# Patient Record
Sex: MALE | Race: WHITE | NOT HISPANIC OR LATINO
[De-identification: names, ages, dates, MRNs, and addresses within clinical notes are randomized per-mention and may not be internally consistent; named-entity substitution may affect disease eponyms.]

---

## 2023-05-24 PROBLEM — Z00.00 ENCOUNTER FOR PREVENTIVE HEALTH EXAMINATION: Status: ACTIVE | Noted: 2023-05-24

## 2023-06-22 ENCOUNTER — APPOINTMENT (OUTPATIENT)
Dept: ORTHOPEDIC SURGERY | Facility: CLINIC | Age: 68
End: 2023-06-22
Payer: COMMERCIAL

## 2023-06-22 VITALS — WEIGHT: 192 LBS | BODY MASS INDEX: 31.99 KG/M2 | HEIGHT: 65 IN

## 2023-06-22 DIAGNOSIS — Z78.9 OTHER SPECIFIED HEALTH STATUS: ICD-10-CM

## 2023-06-22 DIAGNOSIS — M54.50 LOW BACK PAIN, UNSPECIFIED: ICD-10-CM

## 2023-06-22 PROCEDURE — 72170 X-RAY EXAM OF PELVIS: CPT

## 2023-06-22 PROCEDURE — 99214 OFFICE O/P EST MOD 30 MIN: CPT

## 2023-06-22 PROCEDURE — 72110 X-RAY EXAM L-2 SPINE 4/>VWS: CPT
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
## 2023-06-22 NOTE — PHYSICAL EXAM
[Straightening consistent with spasm] : Straightening consistent with spasm [Disc space narrowing] : Disc space narrowing [] : no lumbar paraspinal tenderness [FreeTextEntry1] : multilevel spondylosis [FreeTextEntry3] : L quad atrophy

## 2023-06-22 NOTE — ASSESSMENT
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

## 2023-07-11 ENCOUNTER — APPOINTMENT (OUTPATIENT)
Dept: ORTHOPEDIC SURGERY | Facility: CLINIC | Age: 68
End: 2023-07-11
Payer: COMMERCIAL

## 2023-07-11 VITALS — WEIGHT: 192 LBS | BODY MASS INDEX: 31.99 KG/M2 | HEIGHT: 65 IN

## 2023-07-11 PROCEDURE — 99215 OFFICE O/P EST HI 40 MIN: CPT

## 2023-07-11 NOTE — ASSESSMENT
[FreeTextEntry1] : 68 year M with left hip OA, tonnis grade 3\par \par I,Lazarus Chaney M.D. discussed the patient’s diagnosis and treatment options, non-surgical and surgical, with the patient and/or legal guardian including the potential benefits and complications of each. Potential complications of non-surgical treatments discussed include residual pain and/or disability, non-healing, progression of symptoms and/or disease. Potential complications of surgery discussed include infection, nerve injury, vascular injury, cartilage injury, ligament injury, tendon injury, muscle injury, skin injury, stiffness, instability, weakness, persistent pain, technical or hardware failure, need for additional surgery, re-injury, medical complication, anesthetic related complication, and/or death. All questions were answered. The patient and/or legal guardian has elected to proceed with surgery. Informed consent obtained for Left JOHN GINNA posterior - Dual mobility\par \par                   \par Preoperative evaluation and testing as needed is advised within 30 days prior to the procedure.\par
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
## 2023-07-11 NOTE — IMAGING
[de-identified] : Constitutional: well developed and well nourished, able to communicate\par Cardiovascular: Peripheral vascular exam is grossly normal\par Neurologic: Alert and oriented, no acute distress.\par Skin: normal skin with no ulcers, rashes, or lesions\par Pulmonary: No respiratory distress, breathing comfortably on room air\par Lymphatics: No obvious lymphadenopathy or lymphedema in areas examined\par \par LOWER EXTREMITY/LEFT HIP EXAM\par Standing pelvic alignment: Symmetric with no Trendelenburg\par Atrophy: none\par Ecchymosis/swelling: none\par \par Range of Motion\par Hip: Flexion/extension/ER/IR     / EX 20/ ER 45/ IR 0/ AB 60 /AD 20\par Impingement with flexion adduction and internal rotation: POS\par Contracture: none\par Snapping hip: negative\par Greater trochanter: no tenderness\par \par Neurovascular\par Distal extremities: warm to touch\par Sensation to light touch: intact\par Muscle strength: 5/5\par \par IMAGING:\par 07/11/2023 Xrays of the Left hip 3v were taken demonstrating tonnis grade 3 Osteoarthritis with joint space collapse, sclerosis, osteophytes, and subchondral cysts

## 2023-08-07 ENCOUNTER — APPOINTMENT (OUTPATIENT)
Dept: CT IMAGING | Facility: CLINIC | Age: 68
End: 2023-08-07
Payer: COMMERCIAL

## 2023-08-07 PROCEDURE — 76376 3D RENDER W/INTRP POSTPROCES: CPT | Mod: LT

## 2023-08-07 PROCEDURE — 73700 CT LOWER EXTREMITY W/O DYE: CPT | Mod: LT

## 2023-08-22 ENCOUNTER — NON-APPOINTMENT (OUTPATIENT)
Age: 68
End: 2023-08-22

## 2023-08-22 ENCOUNTER — APPOINTMENT (OUTPATIENT)
Dept: ORTHOPEDIC SURGERY | Facility: CLINIC | Age: 68
End: 2023-08-22
Payer: COMMERCIAL

## 2023-08-22 VITALS — BODY MASS INDEX: 31.99 KG/M2 | HEIGHT: 65 IN | WEIGHT: 192 LBS

## 2023-08-22 PROCEDURE — 99215 OFFICE O/P EST HI 40 MIN: CPT

## 2023-08-22 NOTE — IMAGING
[de-identified] : Constitutional: well developed and well nourished, able to communicate Cardiovascular: Peripheral vascular exam is grossly normal Neurologic: Alert and oriented, no acute distress. Skin: normal skin with no ulcers, rashes, or lesions Pulmonary: No respiratory distress, breathing comfortably on room air Lymphatics: No obvious lymphadenopathy or lymphedema in areas examined  LOWER EXTREMITY/LEFT HIP EXAM Standing pelvic alignment: Symmetric with no Trendelenburg Atrophy: none Ecchymosis/swelling: none  Range of Motion Hip: Flexion/extension/ER/IR     / EX 20/ ER 45/ IR 0/ AB 60 /AD 20 Impingement with flexion adduction and internal rotation: POS Contracture: none Snapping hip: negative Greater trochanter: no tenderness  Neurovascular Distal extremities: warm to touch Sensation to light touch: intact Muscle strength: 5/5  [id number] Xrays of the Left hip 3v were taken demonstrating tonnis grade 3 Osteoarthritis with joint space collapse, sclerosis, osteophytes, and subchondral cysts

## 2023-08-22 NOTE — ASSESSMENT
[FreeTextEntry1] : 68 year M with left hip OA, tonnis grade 3  I,Lazarus Chaney M.D. discussed the patient's diagnosis and treatment options, non-surgical and surgical, with the patient and/or legal guardian including the potential benefits and complications of each. Potential complications of non-surgical treatments discussed include residual pain and/or disability, non-healing, progression of symptoms and/or disease. Potential complications of surgery discussed include infection, nerve injury, vascular injury, cartilage injury, ligament injury, tendon injury, muscle injury, skin injury, stiffness, instability, weakness, persistent pain, technical or hardware failure, need for additional surgery, re-injury, medical complication, anesthetic related complication, and/or death. All questions were answered. The patient and/or legal guardian has elected to proceed with surgery. Informed consent obtained for Left JOHN GINNA posterior - Dual mobility                     Preoperative evaluation and testing as needed is advised within 30 days prior to the procedure.  8/22/23: All preop questions and concerns addressed has lymphedema in the BLE. rec fu with vascular surgery for lymph edema evaluation will need bone grafting for cysts in acetabulum on chronic oxycodone
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
## 2023-09-01 ENCOUNTER — OUTPATIENT (OUTPATIENT)
Dept: OUTPATIENT SERVICES | Facility: HOSPITAL | Age: 68
LOS: 1 days | Discharge: HOME | End: 2023-09-01

## 2023-09-01 VITALS
DIASTOLIC BLOOD PRESSURE: 61 MMHG | RESPIRATION RATE: 17 BRPM | HEIGHT: 67 IN | OXYGEN SATURATION: 100 % | HEART RATE: 59 BPM | WEIGHT: 209 LBS | SYSTOLIC BLOOD PRESSURE: 159 MMHG | TEMPERATURE: 97 F

## 2023-09-01 DIAGNOSIS — Z98.890 OTHER SPECIFIED POSTPROCEDURAL STATES: Chronic | ICD-10-CM

## 2023-09-01 DIAGNOSIS — L97.909 NON-PRESSURE CHRONIC ULCER OF UNSPECIFIED PART OF UNSPECIFIED LOWER LEG WITH UNSPECIFIED SEVERITY: ICD-10-CM

## 2023-09-01 DIAGNOSIS — M16.12 UNILATERAL PRIMARY OSTEOARTHRITIS, LEFT HIP: ICD-10-CM

## 2023-09-01 DIAGNOSIS — Z01.818 ENCOUNTER FOR OTHER PREPROCEDURAL EXAMINATION: ICD-10-CM

## 2023-09-01 DIAGNOSIS — I48.92 UNSPECIFIED ATRIAL FLUTTER: ICD-10-CM

## 2023-09-01 DIAGNOSIS — R60.0 LOCALIZED EDEMA: ICD-10-CM

## 2023-09-01 DIAGNOSIS — I10 ESSENTIAL (PRIMARY) HYPERTENSION: ICD-10-CM

## 2023-09-01 LAB
A1C WITH ESTIMATED AVERAGE GLUCOSE RESULT: 5.5 % — SIGNIFICANT CHANGE UP (ref 4–5.6)
ALBUMIN SERPL ELPH-MCNC: 3.9 G/DL — SIGNIFICANT CHANGE UP (ref 3.3–5)
ALP SERPL-CCNC: 105 U/L — SIGNIFICANT CHANGE UP (ref 40–120)
ALT FLD-CCNC: 30 U/L — SIGNIFICANT CHANGE UP (ref 12–78)
ANION GAP SERPL CALC-SCNC: 8 MMOL/L — SIGNIFICANT CHANGE UP (ref 5–17)
APTT BLD: 39.2 SEC — HIGH (ref 24.5–35.6)
AST SERPL-CCNC: 28 U/L — SIGNIFICANT CHANGE UP (ref 15–37)
BASOPHILS # BLD AUTO: 0.03 K/UL — SIGNIFICANT CHANGE UP (ref 0–0.2)
BASOPHILS NFR BLD AUTO: 0.6 % — SIGNIFICANT CHANGE UP (ref 0–2)
BILIRUB SERPL-MCNC: 0.7 MG/DL — SIGNIFICANT CHANGE UP (ref 0.2–1.2)
BLD GP AB SCN SERPL QL: SIGNIFICANT CHANGE UP
BUN SERPL-MCNC: 7 MG/DL — SIGNIFICANT CHANGE UP (ref 7–23)
CALCIUM SERPL-MCNC: 9.5 MG/DL — SIGNIFICANT CHANGE UP (ref 8.5–10.1)
CHLORIDE SERPL-SCNC: 97 MMOL/L — SIGNIFICANT CHANGE UP (ref 96–108)
CO2 SERPL-SCNC: 26 MMOL/L — SIGNIFICANT CHANGE UP (ref 22–31)
CREAT SERPL-MCNC: 0.72 MG/DL — SIGNIFICANT CHANGE UP (ref 0.5–1.3)
EGFR: 100 ML/MIN/1.73M2 — SIGNIFICANT CHANGE UP
EOSINOPHIL # BLD AUTO: 0.15 K/UL — SIGNIFICANT CHANGE UP (ref 0–0.5)
EOSINOPHIL NFR BLD AUTO: 2.9 % — SIGNIFICANT CHANGE UP (ref 0–6)
ESTIMATED AVERAGE GLUCOSE: 111 MG/DL — SIGNIFICANT CHANGE UP (ref 68–114)
GLUCOSE SERPL-MCNC: 96 MG/DL — SIGNIFICANT CHANGE UP (ref 70–99)
HCT VFR BLD CALC: 39.4 % — SIGNIFICANT CHANGE UP (ref 39–50)
HGB BLD-MCNC: 13.8 G/DL — SIGNIFICANT CHANGE UP (ref 13–17)
IMM GRANULOCYTES NFR BLD AUTO: 0.4 % — SIGNIFICANT CHANGE UP (ref 0–0.9)
INR BLD: 1.46 RATIO — HIGH (ref 0.85–1.18)
LYMPHOCYTES # BLD AUTO: 1.77 K/UL — SIGNIFICANT CHANGE UP (ref 1–3.3)
LYMPHOCYTES # BLD AUTO: 34.8 % — SIGNIFICANT CHANGE UP (ref 13–44)
MCHC RBC-ENTMCNC: 31.2 PG — SIGNIFICANT CHANGE UP (ref 27–34)
MCHC RBC-ENTMCNC: 35 G/DL — SIGNIFICANT CHANGE UP (ref 32–36)
MCV RBC AUTO: 88.9 FL — SIGNIFICANT CHANGE UP (ref 80–100)
MONOCYTES # BLD AUTO: 0.62 K/UL — SIGNIFICANT CHANGE UP (ref 0–0.9)
MONOCYTES NFR BLD AUTO: 12.2 % — SIGNIFICANT CHANGE UP (ref 2–14)
NEUTROPHILS # BLD AUTO: 2.5 K/UL — SIGNIFICANT CHANGE UP (ref 1.8–7.4)
NEUTROPHILS NFR BLD AUTO: 49.1 % — SIGNIFICANT CHANGE UP (ref 43–77)
NRBC # BLD: 0 /100 WBCS — SIGNIFICANT CHANGE UP (ref 0–0)
PLATELET # BLD AUTO: 212 K/UL — SIGNIFICANT CHANGE UP (ref 150–400)
POTASSIUM SERPL-MCNC: 5 MMOL/L — SIGNIFICANT CHANGE UP (ref 3.5–5.3)
POTASSIUM SERPL-SCNC: 5 MMOL/L — SIGNIFICANT CHANGE UP (ref 3.5–5.3)
PROT SERPL-MCNC: 8.6 GM/DL — HIGH (ref 6–8.3)
PROTHROM AB SERPL-ACNC: 17.3 SEC — HIGH (ref 9.5–13)
RBC # BLD: 4.43 M/UL — SIGNIFICANT CHANGE UP (ref 4.2–5.8)
RBC # FLD: 12.1 % — SIGNIFICANT CHANGE UP (ref 10.3–14.5)
SODIUM SERPL-SCNC: 131 MMOL/L — LOW (ref 135–145)
VIT D25+D1,25 OH+D1,25 PNL SERPL-MCNC: 43.3 PG/ML — SIGNIFICANT CHANGE UP (ref 19.9–79.3)
WBC # BLD: 5.09 K/UL — SIGNIFICANT CHANGE UP (ref 3.8–10.5)
WBC # FLD AUTO: 5.09 K/UL — SIGNIFICANT CHANGE UP (ref 3.8–10.5)

## 2023-09-01 NOTE — PHYSICAL THERAPY INITIAL EVALUATION ADULT - GAIT DEVIATIONS NOTED, PT EVAL
decreased step length/decreased weight-shifting ability decreased jay/increased time in double stance/decreased step length/decreased stride length/increased stride width/decreased weight-shifting ability

## 2023-09-01 NOTE — PHYSICAL THERAPY INITIAL EVALUATION ADULT - NSPTDMEREC_GEN_A_CORE
Pt owns straight cane, quad cane, standard walker, and wheelchair/rolling walker/toileting Pt owns straight cane, quad cane, standard walker, and rollator/rolling walker/toileting

## 2023-09-01 NOTE — PHYSICAL THERAPY INITIAL EVALUATION ADULT - LEVEL OF INDEPENDENCE: STAND/SIT, REHAB EVAL
contact guard/minimum assist (75% patients effort) due to pain/contact guard/minimum assist (75% patients effort)

## 2023-09-01 NOTE — H&P PST ADULT - MOUTH
mallampati 1, denies loose teeth/normal mouth and gums/moist mallampati 1, denies dentures, has all missing teeth/normal mouth and gums/moist

## 2023-09-01 NOTE — H&P PST ADULT - PROBLEM SELECTOR PLAN 3
NOTED: left lower leg swelling 3+ pitting w/ lateral LLE ulcer - pt states he banged leg x 2 weeks ago on a furniture, leg weeping with slight redness to site - will need M/C to comment

## 2023-09-01 NOTE — H&P PST ADULT - NSICDXPASTMEDICALHX_GEN_ALL_CORE_FT
PAST MEDICAL HISTORY:  Osteoarthritis of left hip      PAST MEDICAL HISTORY:  Atrial flutter     Hypertension     Leg ulcer     Osteoarthritis of left hip     Pedal edema

## 2023-09-01 NOTE — PHYSICAL THERAPY INITIAL EVALUATION ADULT - IMPAIRMENTS CONTRIBUTING TO GAIT DEVIATIONS, PT EVAL
impaired balance/pain/decreased ROM/decreased strength impaired balance/decreased flexibility/pain/decreased ROM/decreased strength

## 2023-09-01 NOTE — PHYSICAL THERAPY INITIAL EVALUATION ADULT - PERTINENT HX OF CURRENT PROBLEM, REHAB EVAL
Patient attends pre-op testing today following consult c Dr. Chaney due to chronic pain to left hip. Elective L GINNA is now scheduled in this facility for 9/1/2023. Patient attends pre-op testing today following consult c Dr. Chaney due to chronic pain to left hip. Elective L GINNA is now scheduled in this facility for 9/18/2023.

## 2023-09-01 NOTE — PHYSICAL THERAPY INITIAL EVALUATION ADULT - RANGE OF MOTION EXAMINATION, REHAB EVAL
Except RLE severely limited/bilateral upper extremity ROM was WFL (within functional limits)/bilateral lower extremity ROM was WFL (within functional limits) BLE limited due to pain./bilateral upper extremity ROM was WFL (within functional limits)/deficits as listed below

## 2023-09-01 NOTE — PHYSICAL THERAPY INITIAL EVALUATION ADULT - ADDITIONAL COMMENTS
Pt lives with his wife (whom can provide assist upon D/C home) in a private home, 3 entry step (no rail), 4 steps inside c R rail down to where the patient's living area. Pt has a walk in shower stall with a fixed shower head, standard toilet seat height, & no grab bar. Pt states he is currently dependent with community ambulation and some functional mobility with a standard walker. Pt states he is independent with ADL's with a standard walker. Pt reports daily 8/10 pain at rest & states it is worse with any activity 10/10. Pt is left hand dominant, & is retired.  Pt endorses taking oxycodone for pain management. Goal of therapy: manage pain & improve functional mobility. Pt also owns straight cane and quad cane at home. Pt lives with his wife (whom can provide assist upon D/C home) in a private home, 3 entry step (no rail), 4 steps inside c R rail down to where the patient's living area. Pt has a walk in shower stall with a fixed shower head, standard toilet seat height, & no grab bar. Pt states he is currently independent with household ambulation with a standard walker. Pt also owns rollator, straight cane and quad cane at home. Pt states he is independent with ADL's with a standard walker. Pt reports daily 8/10 pain at rest & states it is worse with any activity 10/10. Pt is left hand dominant, doesn't drive, & is retired.  Pt endorses taking oxycodone for pain management. Goal of therapy: manage pain & improve functional mobility.

## 2023-09-01 NOTE — H&P PST ADULT - PROBLEM SELECTOR PLAN 1
Pre-op instructions given. Pt verbalized understanding  Chlorhexidine wash instructions given  Pt instructed to hold all NSAIDs + herbal supplements/vitamins 7 days before surgery  HOLD all diabetic meds + Accu-Chek ordered STAT for day of surgery  T/S ordered STAT before surgery  UCG ordered STAT before surgery  KUB ordered STAT for day of procedure   Spirometer instructions given  Pending: lab result  Pending: Cardiology clearance + M/C for abnormal ecg    Pt instructed to take meds as prescribed     Pt to HOLD all anticoagulant as instructed by PCP    SHAILESH precaution - stop bang Pre-op instructions given. Pt verbalized understanding  Chlorhexidine wash + Ensure clear instructions given  Pt instructed to hold all NSAIDs + herbal supplements/vitamins 7 days before surgery  T/S ordered STAT before surgery  Pt to HOLD all anticoagulant as instructed by PCP  Pending: lab result  Pending: ECG copy from cardiologist office  Cardiology clearance in chart   Pending: M/C + MD to comment on LLE lateral ulcer

## 2023-09-01 NOTE — PHYSICAL THERAPY INITIAL EVALUATION ADULT - GROSSLY INTACT, SENSORY
Except LLE patient reports numbes/tingling/Grossly Intact Except LLE patient reports numbness/tingling and inc pain./Grossly Intact

## 2023-09-01 NOTE — H&P PST ADULT - CARDIOVASCULAR
regular rate and rhythm/S1 S2 present/no murmur negative details… regular rate and rhythm/S1 S2 present/no murmur/pedal edema

## 2023-09-01 NOTE — H&P PST ADULT - HISTORY OF PRESENT ILLNESS
67yo male with medical h/o Aflutter on Xarelto, OA left hip. c/o left hip pain presents today for PST for scheduled Robotic-assisted Left Total Hip Replacement on 9/18/2023 67yo male with medical h/o HTN, Aflutter on Xarelto, Pedal edema-bilateral, and OA left hip, c/o left hip pain presents today for PST for scheduled Robotic-assisted Left Total Hip Replacement on 9/18/2023  NOTED: left lower leg swelling 3+ pitting w/ lateral LLE ulcer - pt states he banged leg x 2 weeks ago on a furniture, leg weeping with slight redness to site - will need M/C to comment

## 2023-09-02 LAB
MRSA PCR RESULT.: SIGNIFICANT CHANGE UP
S AUREUS DNA NOSE QL NAA+PROBE: DETECTED

## 2023-09-04 ENCOUNTER — NON-APPOINTMENT (OUTPATIENT)
Age: 68
End: 2023-09-04

## 2023-09-14 ENCOUNTER — TRANSCRIPTION ENCOUNTER (OUTPATIENT)
Age: 68
End: 2023-09-14

## 2023-09-19 ENCOUNTER — TRANSCRIPTION ENCOUNTER (OUTPATIENT)
Age: 68
End: 2023-09-19

## 2023-10-21 PROBLEM — I10 ESSENTIAL (PRIMARY) HYPERTENSION: Chronic | Status: ACTIVE | Noted: 2023-09-01

## 2023-10-21 PROBLEM — M16.12 UNILATERAL PRIMARY OSTEOARTHRITIS, LEFT HIP: Chronic | Status: ACTIVE | Noted: 2023-09-01

## 2023-10-21 PROBLEM — R60.0 LOCALIZED EDEMA: Chronic | Status: ACTIVE | Noted: 2023-09-01

## 2023-10-21 PROBLEM — L97.909 NON-PRESSURE CHRONIC ULCER OF UNSPECIFIED PART OF UNSPECIFIED LOWER LEG WITH UNSPECIFIED SEVERITY: Chronic | Status: ACTIVE | Noted: 2023-09-01

## 2023-10-21 PROBLEM — I48.92 UNSPECIFIED ATRIAL FLUTTER: Chronic | Status: ACTIVE | Noted: 2023-09-01

## 2023-10-31 ENCOUNTER — OUTPATIENT (OUTPATIENT)
Dept: OUTPATIENT SERVICES | Facility: HOSPITAL | Age: 68
LOS: 1 days | Discharge: HOME | End: 2023-10-31

## 2023-10-31 VITALS
HEART RATE: 60 BPM | DIASTOLIC BLOOD PRESSURE: 70 MMHG | TEMPERATURE: 98 F | RESPIRATION RATE: 18 BRPM | OXYGEN SATURATION: 97 % | SYSTOLIC BLOOD PRESSURE: 163 MMHG | WEIGHT: 216.05 LBS | HEIGHT: 67 IN

## 2023-10-31 VITALS
OXYGEN SATURATION: 97 % | RESPIRATION RATE: 16 BRPM | SYSTOLIC BLOOD PRESSURE: 163 MMHG | HEIGHT: 67 IN | WEIGHT: 195.99 LBS | DIASTOLIC BLOOD PRESSURE: 70 MMHG | TEMPERATURE: 98 F | HEART RATE: 60 BPM

## 2023-10-31 DIAGNOSIS — M16.9 OSTEOARTHRITIS OF HIP, UNSPECIFIED: ICD-10-CM

## 2023-10-31 DIAGNOSIS — M16.12 UNILATERAL PRIMARY OSTEOARTHRITIS, LEFT HIP: ICD-10-CM

## 2023-10-31 DIAGNOSIS — Z01.812 ENCOUNTER FOR PREPROCEDURAL LABORATORY EXAMINATION: ICD-10-CM

## 2023-10-31 DIAGNOSIS — Z86.79 PERSONAL HISTORY OF OTHER DISEASES OF THE CIRCULATORY SYSTEM: ICD-10-CM

## 2023-10-31 DIAGNOSIS — Z01.818 ENCOUNTER FOR OTHER PREPROCEDURAL EXAMINATION: ICD-10-CM

## 2023-10-31 DIAGNOSIS — I10 ESSENTIAL (PRIMARY) HYPERTENSION: ICD-10-CM

## 2023-10-31 DIAGNOSIS — I48.92 UNSPECIFIED ATRIAL FLUTTER: ICD-10-CM

## 2023-10-31 DIAGNOSIS — Z98.890 OTHER SPECIFIED POSTPROCEDURAL STATES: Chronic | ICD-10-CM

## 2023-10-31 LAB
A1C WITH ESTIMATED AVERAGE GLUCOSE RESULT: 5.1 % — SIGNIFICANT CHANGE UP (ref 4–5.6)
A1C WITH ESTIMATED AVERAGE GLUCOSE RESULT: 5.1 % — SIGNIFICANT CHANGE UP (ref 4–5.6)
ALBUMIN SERPL ELPH-MCNC: 3.6 G/DL — SIGNIFICANT CHANGE UP (ref 3.3–5)
ALBUMIN SERPL ELPH-MCNC: 3.6 G/DL — SIGNIFICANT CHANGE UP (ref 3.3–5)
ALP SERPL-CCNC: 90 U/L — SIGNIFICANT CHANGE UP (ref 40–120)
ALP SERPL-CCNC: 90 U/L — SIGNIFICANT CHANGE UP (ref 40–120)
ALT FLD-CCNC: 25 U/L — SIGNIFICANT CHANGE UP (ref 12–78)
ALT FLD-CCNC: 25 U/L — SIGNIFICANT CHANGE UP (ref 12–78)
ANION GAP SERPL CALC-SCNC: 5 MMOL/L — SIGNIFICANT CHANGE UP (ref 5–17)
ANION GAP SERPL CALC-SCNC: 5 MMOL/L — SIGNIFICANT CHANGE UP (ref 5–17)
APTT BLD: 34.8 SEC — SIGNIFICANT CHANGE UP (ref 24.5–35.6)
APTT BLD: 34.8 SEC — SIGNIFICANT CHANGE UP (ref 24.5–35.6)
AST SERPL-CCNC: 22 U/L — SIGNIFICANT CHANGE UP (ref 15–37)
AST SERPL-CCNC: 22 U/L — SIGNIFICANT CHANGE UP (ref 15–37)
BASOPHILS # BLD AUTO: 0.01 K/UL — SIGNIFICANT CHANGE UP (ref 0–0.2)
BASOPHILS # BLD AUTO: 0.01 K/UL — SIGNIFICANT CHANGE UP (ref 0–0.2)
BASOPHILS NFR BLD AUTO: 0.3 % — SIGNIFICANT CHANGE UP (ref 0–2)
BASOPHILS NFR BLD AUTO: 0.3 % — SIGNIFICANT CHANGE UP (ref 0–2)
BILIRUB SERPL-MCNC: 0.5 MG/DL — SIGNIFICANT CHANGE UP (ref 0.2–1.2)
BILIRUB SERPL-MCNC: 0.5 MG/DL — SIGNIFICANT CHANGE UP (ref 0.2–1.2)
BLD GP AB SCN SERPL QL: SIGNIFICANT CHANGE UP
BLD GP AB SCN SERPL QL: SIGNIFICANT CHANGE UP
BUN SERPL-MCNC: 10 MG/DL — SIGNIFICANT CHANGE UP (ref 7–23)
BUN SERPL-MCNC: 10 MG/DL — SIGNIFICANT CHANGE UP (ref 7–23)
CALCIUM SERPL-MCNC: 8.5 MG/DL — SIGNIFICANT CHANGE UP (ref 8.5–10.1)
CALCIUM SERPL-MCNC: 8.5 MG/DL — SIGNIFICANT CHANGE UP (ref 8.5–10.1)
CHLORIDE SERPL-SCNC: 103 MMOL/L — SIGNIFICANT CHANGE UP (ref 96–108)
CHLORIDE SERPL-SCNC: 103 MMOL/L — SIGNIFICANT CHANGE UP (ref 96–108)
CO2 SERPL-SCNC: 25 MMOL/L — SIGNIFICANT CHANGE UP (ref 22–31)
CO2 SERPL-SCNC: 25 MMOL/L — SIGNIFICANT CHANGE UP (ref 22–31)
CREAT SERPL-MCNC: 0.68 MG/DL — SIGNIFICANT CHANGE UP (ref 0.5–1.3)
CREAT SERPL-MCNC: 0.68 MG/DL — SIGNIFICANT CHANGE UP (ref 0.5–1.3)
EGFR: 102 ML/MIN/1.73M2 — SIGNIFICANT CHANGE UP
EGFR: 102 ML/MIN/1.73M2 — SIGNIFICANT CHANGE UP
EOSINOPHIL # BLD AUTO: 0.14 K/UL — SIGNIFICANT CHANGE UP (ref 0–0.5)
EOSINOPHIL # BLD AUTO: 0.14 K/UL — SIGNIFICANT CHANGE UP (ref 0–0.5)
EOSINOPHIL NFR BLD AUTO: 3.6 % — SIGNIFICANT CHANGE UP (ref 0–6)
EOSINOPHIL NFR BLD AUTO: 3.6 % — SIGNIFICANT CHANGE UP (ref 0–6)
ESTIMATED AVERAGE GLUCOSE: 100 MG/DL — SIGNIFICANT CHANGE UP (ref 68–114)
ESTIMATED AVERAGE GLUCOSE: 100 MG/DL — SIGNIFICANT CHANGE UP (ref 68–114)
GLUCOSE SERPL-MCNC: 95 MG/DL — SIGNIFICANT CHANGE UP (ref 70–99)
GLUCOSE SERPL-MCNC: 95 MG/DL — SIGNIFICANT CHANGE UP (ref 70–99)
HCT VFR BLD CALC: 36.1 % — LOW (ref 39–50)
HCT VFR BLD CALC: 36.1 % — LOW (ref 39–50)
HGB BLD-MCNC: 13 G/DL — SIGNIFICANT CHANGE UP (ref 13–17)
HGB BLD-MCNC: 13 G/DL — SIGNIFICANT CHANGE UP (ref 13–17)
IMM GRANULOCYTES NFR BLD AUTO: 0.3 % — SIGNIFICANT CHANGE UP (ref 0–0.9)
IMM GRANULOCYTES NFR BLD AUTO: 0.3 % — SIGNIFICANT CHANGE UP (ref 0–0.9)
INR BLD: 1.17 RATIO — SIGNIFICANT CHANGE UP (ref 0.85–1.18)
INR BLD: 1.17 RATIO — SIGNIFICANT CHANGE UP (ref 0.85–1.18)
LYMPHOCYTES # BLD AUTO: 1.41 K/UL — SIGNIFICANT CHANGE UP (ref 1–3.3)
LYMPHOCYTES # BLD AUTO: 1.41 K/UL — SIGNIFICANT CHANGE UP (ref 1–3.3)
LYMPHOCYTES # BLD AUTO: 35.9 % — SIGNIFICANT CHANGE UP (ref 13–44)
LYMPHOCYTES # BLD AUTO: 35.9 % — SIGNIFICANT CHANGE UP (ref 13–44)
MCHC RBC-ENTMCNC: 32.4 PG — SIGNIFICANT CHANGE UP (ref 27–34)
MCHC RBC-ENTMCNC: 32.4 PG — SIGNIFICANT CHANGE UP (ref 27–34)
MCHC RBC-ENTMCNC: 36 G/DL — SIGNIFICANT CHANGE UP (ref 32–36)
MCHC RBC-ENTMCNC: 36 G/DL — SIGNIFICANT CHANGE UP (ref 32–36)
MCV RBC AUTO: 90 FL — SIGNIFICANT CHANGE UP (ref 80–100)
MCV RBC AUTO: 90 FL — SIGNIFICANT CHANGE UP (ref 80–100)
MONOCYTES # BLD AUTO: 0.42 K/UL — SIGNIFICANT CHANGE UP (ref 0–0.9)
MONOCYTES # BLD AUTO: 0.42 K/UL — SIGNIFICANT CHANGE UP (ref 0–0.9)
MONOCYTES NFR BLD AUTO: 10.7 % — SIGNIFICANT CHANGE UP (ref 2–14)
MONOCYTES NFR BLD AUTO: 10.7 % — SIGNIFICANT CHANGE UP (ref 2–14)
NEUTROPHILS # BLD AUTO: 1.94 K/UL — SIGNIFICANT CHANGE UP (ref 1.8–7.4)
NEUTROPHILS # BLD AUTO: 1.94 K/UL — SIGNIFICANT CHANGE UP (ref 1.8–7.4)
NEUTROPHILS NFR BLD AUTO: 49.2 % — SIGNIFICANT CHANGE UP (ref 43–77)
NEUTROPHILS NFR BLD AUTO: 49.2 % — SIGNIFICANT CHANGE UP (ref 43–77)
NRBC # BLD: 0 /100 WBCS — SIGNIFICANT CHANGE UP (ref 0–0)
NRBC # BLD: 0 /100 WBCS — SIGNIFICANT CHANGE UP (ref 0–0)
PLATELET # BLD AUTO: 145 K/UL — LOW (ref 150–400)
PLATELET # BLD AUTO: 145 K/UL — LOW (ref 150–400)
POTASSIUM SERPL-MCNC: 4.2 MMOL/L — SIGNIFICANT CHANGE UP (ref 3.5–5.3)
POTASSIUM SERPL-MCNC: 4.2 MMOL/L — SIGNIFICANT CHANGE UP (ref 3.5–5.3)
POTASSIUM SERPL-SCNC: 4.2 MMOL/L — SIGNIFICANT CHANGE UP (ref 3.5–5.3)
POTASSIUM SERPL-SCNC: 4.2 MMOL/L — SIGNIFICANT CHANGE UP (ref 3.5–5.3)
PROT SERPL-MCNC: 7.2 GM/DL — SIGNIFICANT CHANGE UP (ref 6–8.3)
PROT SERPL-MCNC: 7.2 GM/DL — SIGNIFICANT CHANGE UP (ref 6–8.3)
PROTHROM AB SERPL-ACNC: 14 SEC — HIGH (ref 9.5–13)
PROTHROM AB SERPL-ACNC: 14 SEC — HIGH (ref 9.5–13)
RBC # BLD: 4.01 M/UL — LOW (ref 4.2–5.8)
RBC # BLD: 4.01 M/UL — LOW (ref 4.2–5.8)
RBC # FLD: 12.5 % — SIGNIFICANT CHANGE UP (ref 10.3–14.5)
RBC # FLD: 12.5 % — SIGNIFICANT CHANGE UP (ref 10.3–14.5)
SODIUM SERPL-SCNC: 133 MMOL/L — LOW (ref 135–145)
SODIUM SERPL-SCNC: 133 MMOL/L — LOW (ref 135–145)
WBC # BLD: 3.93 K/UL — SIGNIFICANT CHANGE UP (ref 3.8–10.5)
WBC # BLD: 3.93 K/UL — SIGNIFICANT CHANGE UP (ref 3.8–10.5)
WBC # FLD AUTO: 3.93 K/UL — SIGNIFICANT CHANGE UP (ref 3.8–10.5)
WBC # FLD AUTO: 3.93 K/UL — SIGNIFICANT CHANGE UP (ref 3.8–10.5)

## 2023-10-31 RX ORDER — SODIUM CHLORIDE 9 MG/ML
3 INJECTION INTRAMUSCULAR; INTRAVENOUS; SUBCUTANEOUS EVERY 8 HOURS
Refills: 0 | Status: DISCONTINUED | OUTPATIENT
Start: 2023-10-31 | End: 2023-11-15

## 2023-10-31 NOTE — CHART NOTE - NSCHARTNOTEFT_GEN_A_CORE
Patient comes to PST after rescheduling surgery due to deranged electrolytes previously, now controlled. Patient reports has received rolling walker and commode from previous PST PT/OT encounter. LTKA now rescheduled for November.

## 2023-10-31 NOTE — H&P PST ADULT - PROBLEM SELECTOR PLAN 1
Left Robotic assisted total hip arthroplasty with JOHN  Pre op instructions discussed, verbalized understanding  Labs as per protocol  Pre op PCP & cardiology evaluation

## 2023-10-31 NOTE — H&P PST ADULT - NSICDXPASTMEDICALHX_GEN_ALL_CORE_FT
PAST MEDICAL HISTORY:  Atrial flutter     History of chronic back pain     Hypertension     Leg ulcer     OA (osteoarthritis)     Osteoarthritis of left hip     Pedal edema     S/P ablation of atrial flutter

## 2023-10-31 NOTE — H&P PST ADULT - HISTORY OF PRESENT ILLNESS
69 yo M  69 yo M with h/o HTN, a. Flutter ( s/p ablation 7/2023), OA, chronic back pain, bilateral LE edema R>L, c/o left hip pain x 2 years. Had no relief with conservative RX- had ortho consult- s/p X-Ray /MRI- scheduled for left robot assisted total hip arthroplasty with JOHN  **Denies any CP/SOB or recent fall/injury  **LD of Eliquis on 11/11/23

## 2023-10-31 NOTE — H&P PST ADULT - FUNCTIONAL STATUS
continue finger sticks with short acting insulin sliding scale  HbA1C 7.8 continue finger sticks with short acting insulin sliding scale  HbA1C 7.8 ADL, ambulates with walker/less than 4 METS

## 2023-11-01 LAB
MRSA PCR RESULT.: SIGNIFICANT CHANGE UP
MRSA PCR RESULT.: SIGNIFICANT CHANGE UP
S AUREUS DNA NOSE QL NAA+PROBE: DETECTED
S AUREUS DNA NOSE QL NAA+PROBE: DETECTED
VIT D25+D1,25 OH+D1,25 PNL SERPL-MCNC: 34.8 PG/ML — SIGNIFICANT CHANGE UP (ref 19.9–79.3)
VIT D25+D1,25 OH+D1,25 PNL SERPL-MCNC: 34.8 PG/ML — SIGNIFICANT CHANGE UP (ref 19.9–79.3)

## 2023-11-02 RX ORDER — MUPIROCIN 20 MG/G
1 OINTMENT TOPICAL
Qty: 22 | Refills: 0
Start: 2023-11-02 | End: 2023-11-06

## 2023-11-14 PROBLEM — Z98.890 OTHER SPECIFIED POSTPROCEDURAL STATES: Chronic | Status: ACTIVE | Noted: 2023-10-31

## 2023-11-14 PROBLEM — Z87.39 PERSONAL HISTORY OF OTHER DISEASES OF THE MUSCULOSKELETAL SYSTEM AND CONNECTIVE TISSUE: Chronic | Status: ACTIVE | Noted: 2023-10-31

## 2023-11-14 PROBLEM — M19.90 UNSPECIFIED OSTEOARTHRITIS, UNSPECIFIED SITE: Chronic | Status: ACTIVE | Noted: 2023-10-31

## 2023-11-14 RX ORDER — SODIUM CHLORIDE 9 MG/ML
1000 INJECTION, SOLUTION INTRAVENOUS
Refills: 0 | Status: DISCONTINUED | OUTPATIENT
Start: 2023-11-15 | End: 2023-11-19

## 2023-11-14 RX ORDER — METOPROLOL TARTRATE 50 MG
25 TABLET ORAL DAILY
Refills: 0 | Status: DISCONTINUED | OUTPATIENT
Start: 2023-11-15 | End: 2023-11-20

## 2023-11-14 RX ORDER — ASPIRIN/CALCIUM CARB/MAGNESIUM 324 MG
81 TABLET ORAL
Refills: 0 | Status: DISCONTINUED | OUTPATIENT
Start: 2023-11-16 | End: 2023-11-16

## 2023-11-14 RX ORDER — PANTOPRAZOLE SODIUM 20 MG/1
40 TABLET, DELAYED RELEASE ORAL
Refills: 0 | Status: DISCONTINUED | OUTPATIENT
Start: 2023-11-15 | End: 2023-11-20

## 2023-11-14 RX ORDER — ACETAMINOPHEN 500 MG
650 TABLET ORAL EVERY 6 HOURS
Refills: 0 | Status: COMPLETED | OUTPATIENT
Start: 2023-11-15 | End: 2023-11-18

## 2023-11-14 RX ORDER — HYDROMORPHONE HYDROCHLORIDE 2 MG/ML
0.5 INJECTION INTRAMUSCULAR; INTRAVENOUS; SUBCUTANEOUS
Refills: 0 | Status: COMPLETED | OUTPATIENT
Start: 2023-11-15 | End: 2023-11-22

## 2023-11-14 RX ORDER — KETOROLAC TROMETHAMINE 30 MG/ML
15 SYRINGE (ML) INJECTION EVERY 6 HOURS
Refills: 0 | Status: DISCONTINUED | OUTPATIENT
Start: 2023-11-15 | End: 2023-11-16

## 2023-11-14 RX ORDER — CYCLOBENZAPRINE HYDROCHLORIDE 10 MG/1
10 TABLET, FILM COATED ORAL THREE TIMES A DAY
Refills: 0 | Status: DISCONTINUED | OUTPATIENT
Start: 2023-11-15 | End: 2023-11-20

## 2023-11-14 RX ORDER — ONDANSETRON 8 MG/1
4 TABLET, FILM COATED ORAL EVERY 6 HOURS
Refills: 0 | Status: DISCONTINUED | OUTPATIENT
Start: 2023-11-15 | End: 2023-11-20

## 2023-11-14 RX ORDER — SODIUM CHLORIDE 9 MG/ML
3 INJECTION INTRAMUSCULAR; INTRAVENOUS; SUBCUTANEOUS EVERY 8 HOURS
Refills: 0 | Status: DISCONTINUED | OUTPATIENT
Start: 2023-11-15 | End: 2023-11-20

## 2023-11-14 RX ORDER — SENNA PLUS 8.6 MG/1
2 TABLET ORAL AT BEDTIME
Refills: 0 | Status: DISCONTINUED | OUTPATIENT
Start: 2023-11-15 | End: 2023-11-20

## 2023-11-14 RX ORDER — LANOLIN ALCOHOL/MO/W.PET/CERES
3 CREAM (GRAM) TOPICAL AT BEDTIME
Refills: 0 | Status: DISCONTINUED | OUTPATIENT
Start: 2023-11-15 | End: 2023-11-16

## 2023-11-14 RX ORDER — POLYETHYLENE GLYCOL 3350 17 G/17G
17 POWDER, FOR SOLUTION ORAL AT BEDTIME
Refills: 0 | Status: DISCONTINUED | OUTPATIENT
Start: 2023-11-15 | End: 2023-11-20

## 2023-11-14 RX ORDER — FUROSEMIDE 40 MG
20 TABLET ORAL DAILY
Refills: 0 | Status: DISCONTINUED | OUTPATIENT
Start: 2023-11-15 | End: 2023-11-20

## 2023-11-14 RX ORDER — ASCORBIC ACID 60 MG
500 TABLET,CHEWABLE ORAL
Refills: 0 | Status: DISCONTINUED | OUTPATIENT
Start: 2023-11-15 | End: 2023-11-20

## 2023-11-14 RX ORDER — LOSARTAN POTASSIUM 100 MG/1
50 TABLET, FILM COATED ORAL DAILY
Refills: 0 | Status: DISCONTINUED | OUTPATIENT
Start: 2023-11-15 | End: 2023-11-20

## 2023-11-14 RX ORDER — CELECOXIB 200 MG/1
200 CAPSULE ORAL EVERY 12 HOURS
Refills: 0 | Status: DISCONTINUED | OUTPATIENT
Start: 2023-11-16 | End: 2023-11-20

## 2023-11-15 ENCOUNTER — APPOINTMENT (OUTPATIENT)
Dept: ORTHOPEDIC SURGERY | Facility: HOSPITAL | Age: 68
End: 2023-11-15
Payer: COMMERCIAL

## 2023-11-15 ENCOUNTER — TRANSCRIPTION ENCOUNTER (OUTPATIENT)
Age: 68
End: 2023-11-15

## 2023-11-15 ENCOUNTER — RESULT REVIEW (OUTPATIENT)
Age: 68
End: 2023-11-15

## 2023-11-15 ENCOUNTER — INPATIENT (INPATIENT)
Facility: HOSPITAL | Age: 68
LOS: 4 days | Discharge: LEFT BEFORE DISCHARGE | End: 2023-11-20
Attending: ORTHOPAEDIC SURGERY | Admitting: ORTHOPAEDIC SURGERY
Payer: COMMERCIAL

## 2023-11-15 VITALS
HEART RATE: 69 BPM | DIASTOLIC BLOOD PRESSURE: 84 MMHG | OXYGEN SATURATION: 98 % | RESPIRATION RATE: 16 BRPM | WEIGHT: 199.96 LBS | HEIGHT: 67 IN | SYSTOLIC BLOOD PRESSURE: 170 MMHG | TEMPERATURE: 98 F

## 2023-11-15 DIAGNOSIS — Z98.890 OTHER SPECIFIED POSTPROCEDURAL STATES: Chronic | ICD-10-CM

## 2023-11-15 LAB
ANION GAP SERPL CALC-SCNC: 4 MMOL/L — LOW (ref 5–17)
ANION GAP SERPL CALC-SCNC: 4 MMOL/L — LOW (ref 5–17)
BLD GP AB SCN SERPL QL: SIGNIFICANT CHANGE UP
BLD GP AB SCN SERPL QL: SIGNIFICANT CHANGE UP
BUN SERPL-MCNC: 9 MG/DL — SIGNIFICANT CHANGE UP (ref 7–23)
BUN SERPL-MCNC: 9 MG/DL — SIGNIFICANT CHANGE UP (ref 7–23)
CALCIUM SERPL-MCNC: 8.5 MG/DL — SIGNIFICANT CHANGE UP (ref 8.5–10.1)
CALCIUM SERPL-MCNC: 8.5 MG/DL — SIGNIFICANT CHANGE UP (ref 8.5–10.1)
CHLORIDE SERPL-SCNC: 103 MMOL/L — SIGNIFICANT CHANGE UP (ref 96–108)
CHLORIDE SERPL-SCNC: 103 MMOL/L — SIGNIFICANT CHANGE UP (ref 96–108)
CO2 SERPL-SCNC: 25 MMOL/L — SIGNIFICANT CHANGE UP (ref 22–31)
CO2 SERPL-SCNC: 25 MMOL/L — SIGNIFICANT CHANGE UP (ref 22–31)
CREAT SERPL-MCNC: 0.62 MG/DL — SIGNIFICANT CHANGE UP (ref 0.5–1.3)
CREAT SERPL-MCNC: 0.62 MG/DL — SIGNIFICANT CHANGE UP (ref 0.5–1.3)
EGFR: 105 ML/MIN/1.73M2 — SIGNIFICANT CHANGE UP
EGFR: 105 ML/MIN/1.73M2 — SIGNIFICANT CHANGE UP
GLUCOSE SERPL-MCNC: 159 MG/DL — HIGH (ref 70–99)
GLUCOSE SERPL-MCNC: 159 MG/DL — HIGH (ref 70–99)
HCT VFR BLD CALC: 33.4 % — LOW (ref 39–50)
HCT VFR BLD CALC: 33.4 % — LOW (ref 39–50)
HGB BLD-MCNC: 11.5 G/DL — LOW (ref 13–17)
HGB BLD-MCNC: 11.5 G/DL — LOW (ref 13–17)
MCHC RBC-ENTMCNC: 31.3 PG — SIGNIFICANT CHANGE UP (ref 27–34)
MCHC RBC-ENTMCNC: 31.3 PG — SIGNIFICANT CHANGE UP (ref 27–34)
MCHC RBC-ENTMCNC: 34.4 G/DL — SIGNIFICANT CHANGE UP (ref 32–36)
MCHC RBC-ENTMCNC: 34.4 G/DL — SIGNIFICANT CHANGE UP (ref 32–36)
MCV RBC AUTO: 90.8 FL — SIGNIFICANT CHANGE UP (ref 80–100)
MCV RBC AUTO: 90.8 FL — SIGNIFICANT CHANGE UP (ref 80–100)
NRBC # BLD: 0 /100 WBCS — SIGNIFICANT CHANGE UP (ref 0–0)
NRBC # BLD: 0 /100 WBCS — SIGNIFICANT CHANGE UP (ref 0–0)
PLATELET # BLD AUTO: 160 K/UL — SIGNIFICANT CHANGE UP (ref 150–400)
PLATELET # BLD AUTO: 160 K/UL — SIGNIFICANT CHANGE UP (ref 150–400)
POTASSIUM SERPL-MCNC: 4.5 MMOL/L — SIGNIFICANT CHANGE UP (ref 3.5–5.3)
POTASSIUM SERPL-MCNC: 4.5 MMOL/L — SIGNIFICANT CHANGE UP (ref 3.5–5.3)
POTASSIUM SERPL-SCNC: 4.5 MMOL/L — SIGNIFICANT CHANGE UP (ref 3.5–5.3)
POTASSIUM SERPL-SCNC: 4.5 MMOL/L — SIGNIFICANT CHANGE UP (ref 3.5–5.3)
RBC # BLD: 3.68 M/UL — LOW (ref 4.2–5.8)
RBC # BLD: 3.68 M/UL — LOW (ref 4.2–5.8)
RBC # FLD: 12.4 % — SIGNIFICANT CHANGE UP (ref 10.3–14.5)
RBC # FLD: 12.4 % — SIGNIFICANT CHANGE UP (ref 10.3–14.5)
SODIUM SERPL-SCNC: 132 MMOL/L — LOW (ref 135–145)
SODIUM SERPL-SCNC: 132 MMOL/L — LOW (ref 135–145)
WBC # BLD: 5.06 K/UL — SIGNIFICANT CHANGE UP (ref 3.8–10.5)
WBC # BLD: 5.06 K/UL — SIGNIFICANT CHANGE UP (ref 3.8–10.5)
WBC # FLD AUTO: 5.06 K/UL — SIGNIFICANT CHANGE UP (ref 3.8–10.5)
WBC # FLD AUTO: 5.06 K/UL — SIGNIFICANT CHANGE UP (ref 3.8–10.5)

## 2023-11-15 PROCEDURE — 20985 CPTR-ASST DIR MS PX: CPT

## 2023-11-15 PROCEDURE — 27130 TOTAL HIP ARTHROPLASTY: CPT | Mod: LT

## 2023-11-15 PROCEDURE — 73501 X-RAY EXAM HIP UNI 1 VIEW: CPT | Mod: 26

## 2023-11-15 DEVICE — SHELL ACET TRIDENT II E 54MM: Type: IMPLANTABLE DEVICE | Site: LEFT | Status: FUNCTIONAL

## 2023-11-15 DEVICE — MAKO CHECKPOINT 3.5MM HEX IMPACTION: Type: IMPLANTABLE DEVICE | Site: LEFT | Status: FUNCTIONAL

## 2023-11-15 DEVICE — INSERT RESTORATION ADM/MDM X3 INS 28/42MM: Type: IMPLANTABLE DEVICE | Site: LEFT | Status: FUNCTIONAL

## 2023-11-15 DEVICE — LINER COCR MDM E 42MM: Type: IMPLANTABLE DEVICE | Site: LEFT | Status: FUNCTIONAL

## 2023-11-15 DEVICE — STEM CMT HIP ACCOLADE II V40 30X101MM 132D SZ 3: Type: IMPLANTABLE DEVICE | Site: LEFT | Status: FUNCTIONAL

## 2023-11-15 DEVICE — IMPLANTABLE DEVICE: Type: IMPLANTABLE DEVICE | Site: LEFT | Status: FUNCTIONAL

## 2023-11-15 DEVICE — MAKO BONE PIN 4MM X 170MM: Type: IMPLANTABLE DEVICE | Site: LEFT | Status: FUNCTIONAL

## 2023-11-15 RX ORDER — OXYCODONE HYDROCHLORIDE 5 MG/1
10 TABLET ORAL EVERY 4 HOURS
Refills: 0 | Status: DISCONTINUED | OUTPATIENT
Start: 2023-11-15 | End: 2023-11-15

## 2023-11-15 RX ORDER — RIVAROXABAN 15 MG-20MG
1 KIT ORAL
Refills: 0 | DISCHARGE

## 2023-11-15 RX ORDER — ACETAMINOPHEN 500 MG
1000 TABLET ORAL ONCE
Refills: 0 | Status: COMPLETED | OUTPATIENT
Start: 2023-11-15 | End: 2023-11-15

## 2023-11-15 RX ORDER — LANOLIN ALCOHOL/MO/W.PET/CERES
3 CREAM (GRAM) TOPICAL AT BEDTIME
Refills: 0 | Status: DISCONTINUED | OUTPATIENT
Start: 2023-11-15 | End: 2023-11-16

## 2023-11-15 RX ORDER — FUROSEMIDE 40 MG
1 TABLET ORAL
Refills: 0 | DISCHARGE

## 2023-11-15 RX ORDER — LOSARTAN POTASSIUM 100 MG/1
1 TABLET, FILM COATED ORAL
Refills: 0 | DISCHARGE

## 2023-11-15 RX ORDER — HYDROMORPHONE HYDROCHLORIDE 2 MG/ML
0.5 INJECTION INTRAMUSCULAR; INTRAVENOUS; SUBCUTANEOUS
Refills: 0 | Status: DISCONTINUED | OUTPATIENT
Start: 2023-11-15 | End: 2023-11-20

## 2023-11-15 RX ORDER — CELECOXIB 200 MG/1
200 CAPSULE ORAL ONCE
Refills: 0 | Status: COMPLETED | OUTPATIENT
Start: 2023-11-15 | End: 2023-11-15

## 2023-11-15 RX ORDER — OXYCODONE HYDROCHLORIDE 5 MG/1
20 TABLET ORAL EVERY 4 HOURS
Refills: 0 | Status: DISCONTINUED | OUTPATIENT
Start: 2023-11-15 | End: 2023-11-16

## 2023-11-15 RX ORDER — SODIUM CHLORIDE 9 MG/ML
1000 INJECTION, SOLUTION INTRAVENOUS
Refills: 0 | Status: DISCONTINUED | OUTPATIENT
Start: 2023-11-15 | End: 2023-11-15

## 2023-11-15 RX ORDER — OXYCODONE HYDROCHLORIDE 5 MG/1
5 TABLET ORAL EVERY 4 HOURS
Refills: 0 | Status: DISCONTINUED | OUTPATIENT
Start: 2023-11-15 | End: 2023-11-15

## 2023-11-15 RX ORDER — CEFAZOLIN SODIUM 1 G
2000 VIAL (EA) INJECTION EVERY 8 HOURS
Refills: 0 | Status: COMPLETED | OUTPATIENT
Start: 2023-11-15 | End: 2023-11-16

## 2023-11-15 RX ORDER — HYDROMORPHONE HYDROCHLORIDE 2 MG/ML
0.5 INJECTION INTRAMUSCULAR; INTRAVENOUS; SUBCUTANEOUS
Refills: 0 | Status: DISCONTINUED | OUTPATIENT
Start: 2023-11-15 | End: 2023-11-15

## 2023-11-15 RX ORDER — ACETAMINOPHEN 500 MG
1000 TABLET ORAL ONCE
Refills: 0 | Status: DISCONTINUED | OUTPATIENT
Start: 2023-11-15 | End: 2023-11-20

## 2023-11-15 RX ORDER — METOPROLOL TARTRATE 50 MG
1 TABLET ORAL
Refills: 0 | DISCHARGE

## 2023-11-15 RX ORDER — OXYCODONE HYDROCHLORIDE 5 MG/1
1 TABLET ORAL
Refills: 0 | DISCHARGE

## 2023-11-15 RX ORDER — ONDANSETRON 8 MG/1
4 TABLET, FILM COATED ORAL ONCE
Refills: 0 | Status: DISCONTINUED | OUTPATIENT
Start: 2023-11-15 | End: 2023-11-15

## 2023-11-15 RX ORDER — APIXABAN 2.5 MG/1
1 TABLET, FILM COATED ORAL
Refills: 0 | DISCHARGE

## 2023-11-15 RX ORDER — OXYCODONE HYDROCHLORIDE 5 MG/1
15 TABLET ORAL EVERY 4 HOURS
Refills: 0 | Status: DISCONTINUED | OUTPATIENT
Start: 2023-11-15 | End: 2023-11-16

## 2023-11-15 RX ORDER — ACETAMINOPHEN 500 MG
1000 TABLET ORAL ONCE
Refills: 0 | Status: DISCONTINUED | OUTPATIENT
Start: 2023-11-15 | End: 2023-11-15

## 2023-11-15 RX ORDER — HYDROMORPHONE HYDROCHLORIDE 2 MG/ML
1 INJECTION INTRAMUSCULAR; INTRAVENOUS; SUBCUTANEOUS
Refills: 0 | Status: DISCONTINUED | OUTPATIENT
Start: 2023-11-15 | End: 2023-11-15

## 2023-11-15 RX ORDER — ACETAMINOPHEN 500 MG
650 TABLET ORAL ONCE
Refills: 0 | Status: COMPLETED | OUTPATIENT
Start: 2023-11-15 | End: 2023-11-15

## 2023-11-15 RX ORDER — THIAMINE MONONITRATE (VIT B1) 100 MG
1 TABLET ORAL
Refills: 0 | DISCHARGE

## 2023-11-15 RX ORDER — FOLIC ACID 0.8 MG
1 TABLET ORAL
Refills: 0 | DISCHARGE

## 2023-11-15 RX ORDER — DEXAMETHASONE 0.5 MG/5ML
10 ELIXIR ORAL ONCE
Refills: 0 | Status: COMPLETED | OUTPATIENT
Start: 2023-11-16 | End: 2023-11-16

## 2023-11-15 RX ADMIN — CELECOXIB 200 MILLIGRAM(S): 200 CAPSULE ORAL at 10:15

## 2023-11-15 RX ADMIN — Medication 650 MILLIGRAM(S): at 10:15

## 2023-11-15 RX ADMIN — HYDROMORPHONE HYDROCHLORIDE 0.5 MILLIGRAM(S): 2 INJECTION INTRAMUSCULAR; INTRAVENOUS; SUBCUTANEOUS at 22:44

## 2023-11-15 RX ADMIN — POLYETHYLENE GLYCOL 3350 17 GRAM(S): 17 POWDER, FOR SOLUTION ORAL at 21:44

## 2023-11-15 RX ADMIN — SODIUM CHLORIDE 115 MILLILITER(S): 9 INJECTION, SOLUTION INTRAVENOUS at 19:06

## 2023-11-15 RX ADMIN — SODIUM CHLORIDE 3 MILLILITER(S): 9 INJECTION INTRAMUSCULAR; INTRAVENOUS; SUBCUTANEOUS at 22:50

## 2023-11-15 RX ADMIN — SODIUM CHLORIDE 125 MILLILITER(S): 9 INJECTION, SOLUTION INTRAVENOUS at 17:31

## 2023-11-15 RX ADMIN — HYDROMORPHONE HYDROCHLORIDE 0.5 MILLIGRAM(S): 2 INJECTION INTRAMUSCULAR; INTRAVENOUS; SUBCUTANEOUS at 21:44

## 2023-11-15 RX ADMIN — Medication 100 MILLIGRAM(S): at 23:22

## 2023-11-15 RX ADMIN — Medication 400 MILLIGRAM(S): at 23:22

## 2023-11-15 RX ADMIN — SENNA PLUS 2 TABLET(S): 8.6 TABLET ORAL at 21:45

## 2023-11-15 RX ADMIN — SODIUM CHLORIDE 3 MILLILITER(S): 9 INJECTION INTRAMUSCULAR; INTRAVENOUS; SUBCUTANEOUS at 17:42

## 2023-11-15 RX ADMIN — OXYCODONE HYDROCHLORIDE 20 MILLIGRAM(S): 5 TABLET ORAL at 23:22

## 2023-11-15 RX ADMIN — OXYCODONE HYDROCHLORIDE 20 MILLIGRAM(S): 5 TABLET ORAL at 19:05

## 2023-11-15 RX ADMIN — OXYCODONE HYDROCHLORIDE 20 MILLIGRAM(S): 5 TABLET ORAL at 20:05

## 2023-11-15 RX ADMIN — SODIUM CHLORIDE 115 MILLILITER(S): 9 INJECTION, SOLUTION INTRAVENOUS at 21:45

## 2023-11-15 NOTE — DISCHARGE NOTE PROVIDER - NSDCFUADDINST_GEN_ALL_CORE_FT
Keep Prineo Dressing Clean, Dry and Intact. May shower with Prineo Dressing. Please do not scrub, soak, peel or pick at the prineo dressing. No creams, lotions, or oils over dressing. May shower and let water run over incision, no baths. Pat dry once out of shower. Dressing to be removed in office at follow up visit in 2 weeks. There are no staples or stitches that need to be removed.  If you notice any redness, irritation, or itching around the prineo dressing call the surgeon's office    [facility] ext [phone] at [facility]    Hip replacement precautions: Abduction pillow. Elevate the leg (while keeping hip precautions) as often as possible to help control swelling.

## 2023-11-15 NOTE — DISCHARGE NOTE PROVIDER - NSDCFUSCHEDAPPT_GEN_ALL_CORE_FT
[facility]  [place] [address]  Scheduled Appointment: 11/28/2023

## 2023-11-15 NOTE — DISCHARGE NOTE PROVIDER - HOSPITAL COURSE
68yMale with history of OA presenting for L GINNA by Dr. Chaney on 11/15/2023. Risk and benefits of surgery were explained to the patient. The patient understood and agreed to proceed with surgery. Patient underwent the procedure with no intraoperative complications. Pt was brought in stable condition to the PACU. Once stable in PACU, pt was brought to the floor. During hospital stay pt was followed by Medicine, physical therapy, Home Care during this admission. Pt had an uneventful hospital course. Pt is stable for discharge to home 68yMale with history of OA presenting for L GINNA by Dr. Chaney on 11/15/2023. Risk and benefits of surgery were explained to the patient. The patient understood and agreed to proceed with surgery. Patient underwent the procedure with no intraoperative complications. Pt was brought in stable condition to the PACU. Once stable in PACU, pt was brought to the floor. During hospital stay pt was followed by Medicine, physical therapy, Home Care during this admission. Pt was transfused one unit of PRBC on 11/18 for acute post operative anemia secondary to intraoperative losses.  Pt is stable for discharge to 68yMale with history of OA presenting for L GINNA by Dr. Chaney on 11/15/2023. Risk and benefits of surgery were explained to the patient. The patient understood and agreed to proceed with surgery. Patient underwent the procedure with no intraoperative complications. Pt was brought in stable condition to the PACU. Once stable in PACU, pt was brought to the floor. During hospital stay pt was followed by Medicine, physical therapy, Home Care during this admission. Pt was transfused one unit of PRBC on 11/18 for acute post operative anemia secondary to intraoperative losses. patient recommended sub acute rehab by physical therapy but patient is refusing and electing to go home AMA. Risks explained to the patient.

## 2023-11-15 NOTE — DISCHARGE NOTE PROVIDER - CARE PROVIDER_API CALL
Lazarus Chaney  Orthopaedic Surgery  [address], [address]  [place] [address]  Phone: [phone]  Fax: [phone]  Follow Up Time:

## 2023-11-15 NOTE — CONSULT NOTE ADULT - SUBJECTIVE AND OBJECTIVE BOX
YUDITH WATSON is a 68y Male s/p LEFT ROBOT ASSISTED TOTAL HIP ARTHROPLASTY WITH JOHN      w/ h/o Osteoarthritis of left hip    Pedal edema    Atrial flutter    Hypertension    Hyperlipidemia    Leg ulcer    OA (osteoarthritis)    S/P ablation of atrial flutter    History of chronic back pain    Marijuana use      denies any chest pain shortness of breath palpitation dizziness lightheadedness nausea vomiting fever or chills    S/P foot surgery, right      No pertinent family history in first degree relatives      SH: doesnot smoke or drink at this time    No Known Allergies    acetaminophen     Tablet .. 650 milliGRAM(s) Oral every 6 hours  acetaminophen   IVPB .. 1000 milliGRAM(s) IV Intermittent once  acetaminophen   IVPB .. 1000 milliGRAM(s) IV Intermittent once  ascorbic acid 500 milliGRAM(s) Oral two times a day  ceFAZolin   IVPB 2000 milliGRAM(s) IV Intermittent every 8 hours  cyclobenzaprine 10 milliGRAM(s) Oral three times a day PRN  furosemide    Tablet 20 milliGRAM(s) Oral daily  HYDROmorphone  Injectable 0.5 milliGRAM(s) IV Push every 3 hours PRN  ketorolac   Injectable 15 milliGRAM(s) IV Push every 6 hours  lactated ringers. 1000 milliLiter(s) IV Continuous <Continuous>  losartan 50 milliGRAM(s) Oral daily  melatonin 3 milliGRAM(s) Oral at bedtime  melatonin 3 milliGRAM(s) Oral at bedtime PRN  metoprolol succinate ER 25 milliGRAM(s) Oral daily  multivitamin 1 Tablet(s) Oral daily  ondansetron Injectable 4 milliGRAM(s) IV Push every 6 hours PRN  oxyCODONE    IR 15 milliGRAM(s) Oral every 4 hours PRN  oxyCODONE    IR 20 milliGRAM(s) Oral every 4 hours PRN  pantoprazole    Tablet 40 milliGRAM(s) Oral before breakfast  polyethylene glycol 3350 17 Gram(s) Oral at bedtime  senna 2 Tablet(s) Oral at bedtime  sodium chloride 0.9% lock flush 3 milliLiter(s) IV Push every 8 hours    T(C): 36.3 (11-15-23 @ 19:45), Max: 36.7 (11-15-23 @ 09:51)  HR: 63 (11-15-23 @ 19:45) (58 - 75)  BP: 146/70 (11-15-23 @ 19:45) (108/56 - 170/84)  RR: 16 (11-15-23 @ 19:45) (12 - 18)  SpO2: 100% (11-15-23 @ 19:45) (94% - 100%)  HEENT unremarkable  neck no JVD or bruit  heart normal S1 S2 RRR no gallops or rubs  chest clear to auscultation  abd sof nontender non distended +bs  ext no calf tenderness    A/P   DVT PX  pain control  bowel regimen   wound care as per ortho  GI PX  antiemetics prn  incentive spirometer

## 2023-11-15 NOTE — ASU PATIENT PROFILE, ADULT - FALL HARM RISK - RISK INTERVENTIONS

## 2023-11-15 NOTE — PATIENT PROFILE ADULT - FUNCTIONAL ASSESSMENT - DAILY ACTIVITY 6.
CARDIOLOGY PROGRESS NOTE    Date:  10/2/2022    Chief Complaint:  HF    History of Present Illness:  Patient is a 54 year old female who was admitted on 9/27/2022 with respiratory failure.  Patient presented with SOB. She also complained of feeling unwell for two weeks with cough, N/V, diarrhea.  She notes SOB worse with exeriton, +LE edmea, wheezing, orthopnea. Took multiple home COVID tests negative.   In ED, was hypoxic with SPO2 60s. CXR with bilateral cephalization, right basilar airspace disease w/ blunted costophrenic angle and left sided mediastinal shift. She was wheezing bilaterally. She was started on IV lasix, antibiotics and admitted to ICU.  Patient seen and examined. She denies CP. No current SOB. +wheezing, cough.     Patient has h/o liver cirrhosis, DM, chronic LE edema, HTN, HLD, diastolic dysfunction.    Subjective:  9/30: denies SOB. Still with edema. On lasix gtt.  On 10 L O2    10/1/2022 no chest pain or sob, feels much better leg edema is improving   10/2/2022 no chest pain or sob, feels much better leg edema is improving  Switched to IV lasix push   Past Medical History:   Diagnosis Date   • Acute respiratory failure (CMS/HCC) 07/04/2014    ZACHERY Kemp. prolonged ventilation.    • Acute respiratory failure (CMS/HCC) 09/28/2022    large right pleural effusion. sepsis.   • Allergic Rhinitis 01/01/1991   • ARF (acute renal failure) (CMS/HCC) 07/04/2014    continuous renal replacement therapy. brief dialysis.    • Asthma 01/01/1991   • atopic dermatitis    • b12 low 04/11/2012   • C. difficile colitis 07/10/2014    while in [facility].    • cirrhosis of liver (CMS/HCC) 12/09/2021    from [facility]   • Cirrhosis, non-alcoholic (CMS/HCC) 01/09/2022   • Decubitus ulcer of back, stage 3 (CMS/HCC) 07/10/2014    Dr Bianchi wound care. healed 3/15   • Decubitus ulcer of buttock, stage 3 (CMS/HCC) 07/10/2014    Dr Bianchi wound care. 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 06/11/2014   • Microalbuminuria 05/03/2011   • Mild nonproliferative diabetic retinopathy of both eyes without macular edema associated with type 2 diabetes mellitus (CMS/HCC) 07/07/2021    Dr Harding   • Morbid obesity (CMS/HCC)    • Neuropathy in diabetes (CMS/HCC) 06/25/2012   • Pressure injury of buttock, stage 2 (CMS/HCC) 03/03/2022   • Severe sepsis (CMS/HCC) 11/29/2021    from foot infection   • Shock circulatory (CMS/HCC) 07/04/2014    K 6.5, HCO3 10, gluc 398, creat 4.29, AST 98, , lactate 13.1. no UTI.    • Spine fracture, stress fx L3 R pedicle 01/29/2014    seen on MRI. 6 lumbar vertebrae - numbering per MRI   • Streptococcal sepsis with acute renal failure (CMS/HCC) 07/04/2014    strep salivarius. one blood culture at 23 hours.     Past Surgical History:   Procedure Laterality Date   • Amputation low leg thru tib/fib Left 11/28/2021   • Central line  07/04/2014    [facility].    • Stereotactic breast biopsy  03/16/2010    right breast     ALLERGIES:   Allergen Reactions   • Gluten Meal   (Food Or Med) Other (See Comments)   • Lotensin [Kdc:Benazepril] Cough   • Mold   (Environmental) Runny Nose   • Nuts   (Food) Other (See Comments)   • Penicillin V RASH     \"pink bumps\"    • Pollen Cough     Sneezing, coughing, watery eyes   • Procardia [Nifedipine] DIZZINESS     Prior to Admission medications    Medication Sig Start Date End Date Taking? Authorizing Provider   azelastine (ASTELIN) 0.1 % nasal spray Spray 1 spray in each nostril 2 times daily as needed (Allergies). 8/5/22  Yes Outside Provider   metFORMIN (GLUCOPHAGE-XR) 500 MG 24 hr tablet Take 2 tablets by mouth 2 times daily (with meals). 5/3/22  Yes Estrella Hodge NP   insulin regular human, CONCENTRATED, (HumuLIN R U-500 KwikPen) 500 UNIT/ML pen-injector Inject 135 Units into the skin daily (with breakfast)  Units daily (with lunch) AND 15 Units daily (with dinner). Plus scale, BG < 100, subtract 10 units. BG > 150, add 5 units. BG > 200, add 10 units. BG > 250, add 15 units. BG > 300, add 20 units. Max daily dose 320 units 4/12/22  Yes Estrella Hodge NP   fluticasone (FLONASE) 50 MCG/ACT nasal spray Spray 1 spray in each nostril daily as needed (allergies). 2/21/22  Yes Outside Provider   valsartan (DIOVAN) 80 MG tablet Take 1 tablet by mouth daily. Original script reactivated upon return home from rehab 9/13/21  Yes Venice Sandy MD   Cholecalciferol 50 mcg (2,000 units) capsule Take 3 capsules by mouth daily. 9/13/21  Yes Venice Sandy MD   cyanocobalamin 500 MCG tablet Take 1 tablet by mouth daily. 9/13/21  Yes Venice Sandy MD   gabapentin (NEURONTIN) 300 MG capsule Take 1 capsule by mouth 2 times daily. 9/13/21  Yes Venice Sandy MD   atorvastatin (LIPITOR) 40 MG tablet Take 1 tablet by mouth daily. 9/13/21  Yes Venice Sandy MD   metoPROLOL tartrate (LOPRESSOR) 25 MG tablet Take 1 tablet by mouth 2 times daily. 9/13/21  Yes Venice Sandy MD   furosemide (LASIX) 40 MG tablet Take 1 tablet by mouth 2 times daily. Am and afternoon  Patient taking differently: Take 40 mg by mouth daily. 9/13/21  Yes Venice Sandy MD   DULoxetine (CYMBALTA) 60 MG capsule Take 1 capsule by mouth daily. 9/13/21  Yes  Venice Sandy MD   mupirocin (Bactroban) 2 % ointment Apply topically 3 times daily. To umbilicus wound 9/13/21  Yes Venice Sandy MD   folic acid (FOLATE) 1 MG tablet Take 1 mg by mouth daily.   Yes Outside Provider   albuterol 108 (90 Base) MCG/ACT inhaler Inhale 1 puff into the lungs every 4 hours as needed.  9/23/20  Yes Outside Provider   budesonide (PULMICORT FLEXHALER) 180 MCG/ACT inhaler Inhale 1 puff into the lungs 2 times daily.    Yes Outside Provider   vitamin E 400 UNIT capsule Take 2 capsules by mouth daily. 8/16/19  Yes Venice Sandy MD   montelukast (SINGULAIR) 10 MG tablet Take 10 mg by mouth daily (before breakfast).    Yes Outside Provider   cetirizine-pseudoephedrine (ZyrTEC-D) 5-120 MG per tablet Take 1 tablet by mouth 2 times daily. 8/7/14  Yes Venice Sandy MD   blood glucose (ALEXY CONTOUR NEXT TEST) test strip TEST BLOOD SUGAR THREE TIMES DAILY 9/27/22   Estrella Hodge NP   Insulin Pen Needle (1st Tier Unifine Pentips) 31G X 5 MM Misc Use to inject insulin 3 times daily. Remove needle cover(s) to expose needle before injecting. 4/12/22   Estrella Hodge NP   blood glucose lancets Test blood sugar 3-4 times daily as directed. Diagnosis: E11.8. Meter: Contour Next 4/12/22   Estrella Hodge NP   lisinopril (ZESTRIL) 20 MG tablet Take 1 tablet by mouth daily. 8/27/20 10/15/20  Venice Sandy MD     Current Facility-Administered Medications   Medication Dose Route Frequency Provider Last Rate Last Admin   • magnesium oxide (MAG-OX) tablet 400 mg  400 mg Oral Once Lachelle Chapman MD       • albumin human 5 % injection 12.5 g  12.5 g Intravenous Once Todd Haq MD        Followed by   • albumin human 5 % injection 12.5 g  12.5 g Intravenous Once Todd Haq MD       • LIDOCAINE HCL (PF) 2 % IJ SOLN Pyxis Override            • metoPROLOL tartrate (LOPRESSOR) tablet 25 mg  25 mg Oral BID Manisha Miranda MD   25 mg at 10/01/22 0471   • insulin glargine (LANTUS) injection 50  Units  50 Units Subcutaneous 2 times per day Manisha Miranda MD   50 Units at 10/01/22 2117   • furosemide (LASIX INJECT) injection 40 mg  40 mg Intravenous BID Manisha Miranda MD   40 mg at 10/01/22 1803   • enoxaparin (LOVENOX) injection 40 mg  40 mg Subcutaneous 2 times per day Manisha Miranda MD   40 mg at 10/01/22 2004   • DEXTROSE 5 % IV SOLN Pyxis Override            • spironolactone (ALDACTONE) tablet 50 mg  50 mg Oral Daily Manisha Miranda MD   50 mg at 10/01/22 0948   • famotidine (PEPCID) tablet 20 mg  20 mg Oral 2 times per day Manisha Miranda MD   20 mg at 10/01/22 2002   • Magnesium Standard Replacement Protocol   Does not apply See Admin Instructions Manisha Miranda MD       • Potassium Standard Replacement Protocol (Levels 3.5 and lower)   Does not apply See Admin Instructions Manisha Miranda MD       • sodium chloride (PF) 0.9 % injection 2 mL  2 mL Intracatheter 2 times per day Grady Odell MD   2 mL at 10/01/22 2000   • atorvastatin (LIPITOR) tablet 40 mg  40 mg Oral Daily Manisha Miranda MD   40 mg at 10/01/22 0948   • fluticasone furoate (ARNUITY ELLIPTA) 100 MCG/ACT inhaler 1 puff  1 puff Inhalation Daily Manisha Miranda MD   1 puff at 10/01/22 1036   • DULoxetine (CYMBALTA) capsule 60 mg  60 mg Oral Daily Manisha Miranda MD   60 mg at 10/01/22 0948   • gabapentin (NEURONTIN) capsule 300 mg  300 mg Oral BID Manisha Miranda MD   300 mg at 10/01/22 2002   • folic acid (FOLATE) tablet 1 mg  1 mg Oral Daily Manisha Miranda MD   1 mg at 10/01/22 0948   • montelukast (SINGULAIR) tablet 10 mg  10 mg Oral QAM AC Manisha Miranda MD   10 mg at 10/02/22 0632   • insulin lispro (ADMELOG,HumaLOG) - Scheduled Mealtime Dose   Subcutaneous TID MILA Miranda MD   10 Units at 09/29/22 1814   • insulin lispro (ADMELOG,HumaLOG) - Correction Dose   Subcutaneous TID MILA Miranda MD   2 Units at 10/01/22 1804   • insulin lispro (ADMELOG,HumaLOG) - Correction Dose   Subcutaneous Nightly Manisha Miranda MD   3 Units at 09/27/22 4016     Current Facility-Administered  Medications   Medication Dose Route Frequency Provider Last Rate Last Admin   • sodium chloride 0.9% infusion   Intravenous Continuous MAGNUS Miranda MD       • sodium chloride 0.9% infusion   Intravenous Continuous BUSTERN Manisha Miranda MD   Completed at 10/01/22 1048     Social History     Tobacco Use   • Smoking status: Never Smoker   • Smokeless tobacco: Never Used   • Tobacco comment: no smokers in home   Substance Use Topics   • Alcohol use: Yes     Comment: 2-3 drinks a year     Family History   Problem Relation Age of Onset   • Diabetes Mother    • Diabetes Sister    • Cancer Paternal Grandmother    • Cancer Paternal Grandfather        Review of Systems:  10 systems reviewed and otherwise negative except for what is stated in past medical history and HPI (history of present illness).    Physical Exam:   EXAM:   Vitals:    10/02/22 0300   BP: (!) 87/51   Pulse: 73   Resp: (!) 23   Temp:      General Appearance: NAD  Neck: No JVD (jugular venous distension), no thyroid enlargement.  Respiratory: Lungs diminished BS  Cardiovascular: Regular rate and rhythm, normal S1 and S2, no S3 or murmur; No carotid bruits, 3+ LE (lower extremity) edema,  Gastrointestinal: Abdomen is nontender, nondistended, positive bowel sounds  Skin: Warm and dry; no rashes or erythema.   Neuro:  AA+Ox3. Moves all 4 extremities equally, follows directions and answers questions appropriately  Psych:  Normal mood and affect      Data Review:    Lab Results   Component Value Date    SODIUM 139 10/02/2022    SODIUM 139 10/02/2022    POTASSIUM 3.7 10/02/2022    POTASSIUM 3.7 10/02/2022    BUN 36 (H) 10/02/2022    BUN 36 (H) 10/02/2022    CREATININE 1.63 (H) 10/02/2022    CREATININE 1.61 (H) 10/02/2022    WBC 6.4 10/01/2022    HCT 32.5 (L) 10/01/2022    HGB 10.3 (L) 10/01/2022    PLT 93 (L) 10/01/2022    INR 1.1 09/30/2022    GLUCOSE 251 (H) 10/02/2022    GLUCOSE 250 (H) 10/02/2022    TSH 1.746 09/23/2022    NTPROB 707 (H) 09/27/2022    CHOLESTEROL  116 09/23/2022    HDL 36 (L) 09/23/2022    CALCLDL 58 09/23/2022    TRIGLYCERIDE 111 09/23/2022    MG 1.7 10/02/2022     Albumin (g/dL)   Date Value   10/02/2022 2.4 (L)             Electrocardiogram:        Imaging:  Chest x-ray:    1.  Relatively stable large right pleural effusion and compressive atelectasis/consolidation of the right lung.       Echo 9/28/2022  Final Impressions  Normal left ventricular chamber size.  Normal left ventricular systolic function with ejection fraction of 61 %.  Moderately increased left ventricular wall thickness. Basal septal hypertrophy.  Grade I left ventricular diastolic dysfunction.  LVOT peak gradient at rest; 2 mmHg.  Normal right ventricular chamber size.  Normal right ventricular systolic function.  Mildly elevated right ventricular systolic pressure 33 mmHg.  Moderately dilated ascending aorta, 4.2 cm.  No significant change since the prior study    Echo 2021  Final Impressions  Normal left ventricular size and systolic function. LVEF = 57%.  Grade II diastolic dysfunction of the left ventricle (pseudonormal filling pattern).  Mildly increased RV size with normal systolic function. PASP = 21 mmHg.  No significant valve abnormalities.  Dilated ascending aorta (42 mm).  No pericardial effusion.    Assessment and Plan:  · Acute on chronic HFpEF. LVEF 61% with moderate LVH, basal septal hypertrophy, diastolic dysfunction per updated echo 9/28/2022. +LE edema. CXR with large R pleural effusion. proBNP minimal 707. Albumin low contributing to fluid overload. Switched to IV lasix push . . monitor Daily weight, I+O.conitnue lasix drip for now   · Acute hypoxic respiratory failure, on 7 L O2  · Right pleural effusion, IR thoracentesis planned.  · Possible PNA, felt to be ruled out and antibiotics stopped  · HTN, on metoprolol 50 mg BID  · Thrombocytopenia  · Liver cirrhosis   · Morbid obesity, recommend sleep eval outpatient r/o SHANTELLE      10/2/2022       Lachelle Chapman MD     4 = No assist / stand by assistance

## 2023-11-15 NOTE — DISCHARGE NOTE PROVIDER - NSDCFUADDAPPT_GEN_ALL_CORE_FT
Follow up with your surgeon in two weeks. Call for appointment.    If you need more pain medications, call your surgeon's office. For medication refills or authorizations call [phone]    Make sure to have a bowel movement by 2 days after surgery. Take stool softners and laxatives as needed.    Call and schedule a follow up appointment with your primary care physician for repeat blood work (CBC and BMP) for post hospital discharge follow-up care.    Call your surgeon if you have increased redness/pain/drainage or fever. Return to ER for shortness of breath/calf tenderness.

## 2023-11-15 NOTE — DISCHARGE NOTE PROVIDER - NSDCMRMEDTOKEN_GEN_ALL_CORE_FT
Cozaar 50 mg oral tablet: 1 tab(s) orally once a day  Eliquis 5 mg oral tablet: 1 tab(s) orally 2 times a day  folic acid 1 mg oral tablet: 1 orally once a day  Lasix 20 mg oral tablet: 1 tab(s) orally once a day  magnesium oxide supplement 400mg once daily by mouth:   mupirocin 2% topical ointment: Apply topically to affected area 2 times a day  oxyCODONE 20 mg oral tablet: 1 orally every 6 hours as needed for  moderate pain  thiamine 100 mg oral tablet: 1 orally once a day  Toprol-XL 25 mg oral tablet, extended release: 1 orally once a day  zolpidem 12.5 mg oral tablet, extended release: 1 orally once a day (at bedtime)   ascorbic acid 500 mg oral tablet: 1 tab(s) orally 2 times a day  Cozaar 50 mg oral tablet: 1 tab(s) orally once a day  Eliquis 5 mg oral tablet: 1 tab(s) orally 2 times a day  folic acid 1 mg oral tablet: 1 orally once a day  Lasix 20 mg oral tablet: 1 tab(s) orally once a day  Multiple Vitamins oral tablet: 1 tab(s) orally once a day  oxyCODONE 20 mg oral tablet: 1 orally every 6 hours as needed for  moderate pain  thiamine 100 mg oral tablet: 1 orally once a day  Toprol-XL 25 mg oral tablet, extended release: 1 orally once a day

## 2023-11-15 NOTE — DISCHARGE NOTE PROVIDER - NSDCDCMDCOMP_GEN_ALL_CORE
I, Carol Rendon, attest that I performed the duties of a scribe for this entire encounter in the presence of Dr. Juma Urrutia and the patient.     HISTORY OF PRESENT ILLNESS  Mike is a 89 year old male, here for a Medicare subsequent annual wellness visit and follow-up of his chronic medical problems. His blood pressure is perfect today at 130/68 on Lisinopril 40 mg a day, Lozol 1.25 mg a day and Norvasc 5 mg a day. His weight is stable at 161 with a BMI of 26.8.  The patient has metastatic prostate carcinoma and renal carcinoma which are followed by Dr. Osborne. He is on Flomax 0.4 mg 2 tablets daily and is getting Lupron every 6 months. His PSA is 1.69. June 2021 CT Scan of the pelvis was reviewed. The patient is hard of hearing and is putting off getting hearing aids. His non-insulin dependent diabetes mellitus is in excellent control on Metformin XR 1000 mg daily. His [id number] is 6.6.  We will consult Dr. Rogers for a diabetic eye examination. His neuropathy is controlled on Gabapentin 300 mg 4 times a day. He really notices it if he forgets to take Gabapntin. His hyperlipidemia is being treated with Lipitor 40 mg a day. His total cholesterol is 164. LDL is 99. His stage 3a chronic kidney disease is stable. GFR is 47. We will continue to monitor. He has pancytopenia which is new and likely secondary to metastatic disease. Hemoglobin is 10.9 with hematocrit of 33.7. We will consult Dr. Kohler for pancytopenia. All other lab results were reviewed and are within normal limits. He is taking Vitamin D-3 daily. Third COVID19 and high dose influenza vaccines were given today. I will see him back in 6 months for follow up with a pre-clinic basic metabolic panel, CBC, lipid panel and liver panel. He was instructed to contact our office with any questions or concerns.      MEDICATIONS  Current Outpatient Medications   Medication Sig   • lisinopril (ZESTRIL) 40 MG tablet TAKE 1 TABLET BY MOUTH  DAILY   •  amLODIPine (NORVASC) 5 MG tablet TAKE 1 TABLET BY MOUTH  DAILY   • atorvastatin (LIPITOR) 40 MG tablet TAKE 1 TABLET BY MOUTH  DAILY   • indapamide (LOZOL) 1.25 MG tablet TAKE 1 TABLET BY MOUTH  DAILY   • tamsulosin (FLOMAX) 0.4 MG Cap TAKE 2 CAPSULES BY MOUTH  DAILY   • metFORMIN (GLUCOPHAGE-XR) 500 MG 24 hr tablet TAKE 2 TABLETS BY MOUTH  DAILY WITH BREAKFAST   • gabapentin (NEURONTIN) 300 MG capsule TAKE 1 CAPSULE BY MOUTH 4  TIMES DAILY   • OXYCODONE-ASPIRIN PO    • Cholecalciferol (VITAMIN D3) 400 units Cap Take 400 Int'l Units by mouth daily.   • Menthol, Topical Analgesic, (BIOFREEZE) 4 % Gel    • docusate sodium (COLACE) 100 MG capsule Take one cap by mouth with each dose of oxycodone.  Stop if diarrhea develops.     No current facility-administered medications for this visit.     ALLERGIES:  Patient has no known allergies.    HISTORY  Past Medical History:   Diagnosis Date   • CKD (chronic kidney disease), stage III (CMS/HCC)    • Dyslipidemia    • Elevated TSH    • GERD (gastroesophageal reflux disease)    • HTN (hypertension)    • Neuropathy 10/2017    Polyneuropathy B/L lower ext   • Osteoarthritis    • Prostate CA (CMS/HCC)    • Renal cancer (CMS/HCC)     s/p nephrectomy   • Type II or unspecified type diabetes mellitus without mention of complication, not stated as uncontrolled      Past Surgical History:   Procedure Laterality Date   • Colonoscopy w/ polypectomy  12/14/2009   • Laparoscopic nephroureterectomy, hand assisted  08/16/2010   • Prostate biopsy  07/23/2010     Family History   Family history unknown: Yes     Social History     Socioeconomic History   • Marital status: Single     Spouse name: Not on file   • Number of children: Not on file   • Years of education: Not on file   • Highest education level: Not on file   Occupational History   • Not on file   Tobacco Use   • Smoking status: Never Smoker   • Smokeless tobacco: Never Used   Vaping Use   • Vaping Use: never used   Substance and  Sexual Activity   • Alcohol use: No   • Drug use: No   • Sexual activity: Not on file   Other Topics Concern   • Not on file   Social History Narrative   • Not on file     Social Determinants of Health     Financial Resource Strain:    • Social Determinants: Financial Resource Strain: Not on file   Food Insecurity:    • Social Determinants: Food Insecurity: Not on file   Transportation Needs:    • Lack of Transportation (Medical): Not on file   • Lack of Transportation (Non-Medical): Not on file   Physical Activity:    • Days of Exercise per Week: Not on file   • Minutes of Exercise per Session: Not on file   Stress:    • Social Determinants: Stress: Not on file   Social Connections:    • Social Determinants: Social Connections: Not on file   Intimate Partner Violence:    • Social Determinants: Intimate Partner Violence Past Fear: Not on file   • Social Determinants: Intimate Partner Violence Current Fear: Not on file     Social History     Tobacco Use   Smoking Status Never Smoker   Smokeless Tobacco Never Used     Immunization History   Administered Date(s) Administered   • COVID-19 Moderna 0.25 mL Booster Vaccine 12/23/2021   • COVID-19 Moderna 0.5 mL Vaccine 06/13/2021, 07/16/2021   • Influenza, high dose quadrivalent, preservative-free 12/16/2020, 12/23/2021   • Influenza, high dose seasonal, preservative-free 11/08/2017, 09/30/2019   • Influenza, injectable, quadrivalent 10/27/2014   • Influenza, injectable, quadrivalent, preservative-free 10/08/2013   • Influenza, seasonal, injectable, trivalent 12/01/2012, 09/10/2016   • Pneumococcal Conjugate 13 valent 05/02/2016   • Pneumococcal polysaccharide, adult, 23 valent 12/07/2010, 12/16/2020   • Tdap 07/27/2012       REVIEW OF SYSTEMS  Pertinent positives in History of Present Illness.  All other review of systems reviewed and negative.  I did a depression screen on the patient; he does not feel down, depressed or hopeless.  Denies anhedonia.  The patient's get up  and go test was normal.  No unsteadiness.  Vision followed by ophthalmologist, including regular screening for glaucoma.      CURRENT PROVIDERS  Patient Care Team:  Juma Urrutia MD as PCP - General (Internal Medicine)  Manuel Osborne MD - Urologist    FUNCTIONAL ABILITY AND SAFETY SCREEN  He is not working.  He lives at home.  He denies needing any help with phone, transportation, shopping or preparing meals.  Patient remains active and independent.  He does not have any dangerous situations in his home.  No need for grab bars or additional lighting. No risk factors for Hepatitis B.  We talked about advance directives and he understands the importance of having one.  No detection of cognitive impairment by direct observation.     PHYSICAL EXAMINATION  VITAL SIGNS:    Visit Vitals  /68   Pulse 92   Resp 18   Ht 5' 5\" (1.651 m)   Wt 73 kg (161 lb)   SpO2 96%   BMI 26.79 kg/m²   .  GENERAL:  Well-developed male who appears stated age.  He is alert, oriented x3, and in no acute distress.  Non-ill appearing.     SKIN:  Dry.  No rashes.   LYMPH NODES:  No cervical or supraclavicular adenopathy.   HEENT:  Head normocephalic.  Ears are normal bilaterally.  Tympanic membranes are intact and external auditory canals normal.  Hard of hearing; hearing aids, none.  Extraocular movements intact.  Eyes - Pupils are equal, round, reactive to light and accommodation.  Conjunctivae are clear.   NECK:  Supple, no palpable abnormalities.  Thyroid midline and symmetric.  Carotid upstrokes equal bilaterally, no bruit.   LUNGS:  Clear to auscultation.  Breath sounds equal bilaterally.   HEART:  S1 and S2 regular.  No murmur.   ABDOMEN:  Soft and nontender.  No masses.  No hepatomegaly.  No splenomegaly.   MALE GENITALIA:  Done by Dr. Osborne.  RECTAL:  Done by Dr. Osborne.  EXTREMITIES:  Trace edema bilateral lower, no cyanosis or clubbing.        LABS  Lab Services on 12/17/2021   Component Date Value Ref Range Status   • COLOR,  URINALYSIS 12/17/2021 Yellow   Final   • APPEARANCE, URINALYSIS 12/17/2021 Clear   Final   • GLUCOSE, URINALYSIS 12/17/2021 Negative  Negative mg/dL Final   • BILIRUBIN, URINALYSIS 12/17/2021 Negative  Negative Final   • KETONES, URINALYSIS 12/17/2021 Negative  Negative mg/dL Final   • SPECIFIC GRAVITY, URINALYSIS 12/17/2021 1.015  1.005 - 1.030 Final   • OCCULT BLOOD, URINALYSIS 12/17/2021 Negative  Negative Final   • PH, URINALYSIS 12/17/2021 5.5  5.0 - 7.0 Final   • PROTEIN, URINALYSIS 12/17/2021 Negative  Negative mg/dL Final   • UROBILINOGEN, URINALYSIS 12/17/2021 0.2  0.2, 1.0 mg/dL Final   • NITRITE, URINALYSIS 12/17/2021 Negative  Negative Final   • LEUKOCYTE ESTERASE, URINALYSIS 12/17/2021 Negative  Negative Final   • Microalbumin, Urine 12/17/2021 <0.50  mg/dL Final   • Creatinine, Urine 12/17/2021 68.30  mg/dL Final   • Microalbumin/ Creatinine Ratio 12/17/2021    Final   Lab Services on 12/16/2021   Component Date Value Ref Range Status   • Fasting Status 12/16/2021 12  0 - 999 Hours Final   • Sodium 12/16/2021 142  135 - 145 mmol/L Final   • Potassium 12/16/2021 4.9  3.4 - 5.1 mmol/L Final   • Chloride 12/16/2021 107  98 - 107 mmol/L Final   • Carbon Dioxide 12/16/2021 27  21 - 32 mmol/L Final   • Anion Gap 12/16/2021 13  10 - 20 mmol/L Final   • Glucose 12/16/2021 155* 70 - 99 mg/dL Final   • BUN 12/16/2021 21* 6 - 20 mg/dL Final   • Creatinine 12/16/2021 1.34* 0.67 - 1.17 mg/dL Final   • Glomerular Filtration Rate 12/16/2021 47* >=60 Final   • BUN/ Creatinine Ratio 12/16/2021 16  7 - 25 Final   • Calcium 12/16/2021 9.0  8.4 - 10.2 mg/dL Final   • TSH 12/16/2021 6.942* 0.350 - 5.000 mcUnits/mL Final   • Fasting Status 12/16/2021 12  0 - 999 Hours Final   • Cholesterol 12/16/2021 164  <=199 mg/dL Final   • Triglycerides 12/16/2021 136  <=149 mg/dL Final   • HDL 12/16/2021 38* >=40 mg/dL Final   • LDL 12/16/2021 99  <=129 mg/dL Final   • Non-HDL Cholesterol 12/16/2021 126  mg/dL Final   • Cholesterol/  HDL Ratio 12/16/2021 4.3  <=4.4 Final   • Albumin 12/16/2021 3.4* 3.6 - 5.1 g/dL Final   • Bilirubin, Total 12/16/2021 0.5  0.2 - 1.0 mg/dL Final   • Bilirubin, Direct 12/16/2021 0.1  0.0 - 0.2 mg/dL Final   • Alkaline Phosphatase 12/16/2021 110  45 - 117 Units/L Final   • GPT/ALT 12/16/2021 15  <64 Units/L Final   • GOT/AST 12/16/2021 19  <=37 Units/L Final   • Protein, Total 12/16/2021 6.9  6.4 - 8.2 g/dL Final   • Prostate Specific Antigen 12/16/2021 1.69  <=6.50 ng/mL Final   • Hemoglobin A1C 12/16/2021 6.6* 4.5 - 5.6 % Final   • WBC 12/16/2021 3.4* 4.2 - 11.0 K/mcL Final   • RBC 12/16/2021 3.47* 4.50 - 5.90 mil/mcL Final   • HGB 12/16/2021 10.9* 13.0 - 17.0 g/dL Final   • HCT 12/16/2021 33.7* 39.0 - 51.0 % Final   • MCV 12/16/2021 97.1  78.0 - 100.0 fl Final   • MCH 12/16/2021 31.4  26.0 - 34.0 pg Final   • MCHC 12/16/2021 32.3  32.0 - 36.5 g/dL Final   • RDW-CV 12/16/2021 14.3  11.0 - 15.0 % Final   • RDW-SD 12/16/2021 50.3* 39.0 - 50.0 fL Final   • PLT 12/16/2021 86* 140 - 450 K/mcL Final   • NRBC 12/16/2021 0  <=0 /100 WBC Final   • Neutrophil, Percent 12/16/2021 46  % Final   • Lymphocytes, Percent 12/16/2021 45  % Final   • Mono, Percent 12/16/2021 7  % Final   • Eosinophils, Percent 12/16/2021 1  % Final   • Basophils, Percent 12/16/2021 1  % Final   • Immature Granulocytes 12/16/2021 0  % Final   • Absolute Neutrophils 12/16/2021 1.5* 1.8 - 7.7 K/mcL Final   • Absolute Lymphocytes 12/16/2021 1.5  1.0 - 4.0 K/mcL Final   • Absolute Monocytes 12/16/2021 0.2* 0.3 - 0.9 K/mcL Final   • Absolute Eosinophils  12/16/2021 0.0  0.0 - 0.5 K/mcL Final   • Absolute Basophils 12/16/2021 0.0  0.0 - 0.3 K/mcL Final   • Absolute Immmature Granulocytes 12/16/2021 0.0  0.0 - 0.2 K/mcL Final   • Extra Tube 12/16/2021 Hold for Add Ons   Final        IMPRESSION   1. Medicare subsequent annual wellness visit.  2. Health maintenance topics reviewed.   3. Influenza and pneumonia vaccines reviewed and up to date.   4.  Hypertension, good control.  5. Metastatic prostate carcinoma, Dr. Osborne.  6. Hard of hearing, putting off hearing aids.  7. Neuropathy, controlled.  8. Hyperlipidemia, on Lipitor 40 mg.  9. Non-insulin dependent diabetes mellitus, excellent control.  10. Chronic kidney disease-stage 3a, stable.  11. Renal cell carcinoma, Dr. Osborne.  12. Pancytopenia.    PLAN  Third COVID19 and high dose influenza vaccines today.  Hematology consultation with Dr. Kohler for pancytopenia.  Ophthalmology consultation for an eye examination.  Contact our office with any questions or concerns.  Patient should return to clinic in 6 months, or sooner as needed with pre-clinic basic metabolic panel, CBC, lipid panel and liver panel.      On 12/23/21, I, Carol Rendon scribed the services personally performed by Juma Urrutia MD.         The documentation recorded by the scribe accurately and completely reflects the service(s) I personally performed and the decisions made by me.      This document is complete and the patient is ready for discharge.

## 2023-11-15 NOTE — ASU PATIENT PROFILE, ADULT - NSICDXPASTMEDICALHX_GEN_ALL_CORE_FT
PAST MEDICAL HISTORY:  Atrial flutter     History of chronic back pain     Hypertension     Leg ulcer     Marijuana use daily    OA (osteoarthritis)     Osteoarthritis of left hip     Pedal edema     S/P ablation of atrial flutter

## 2023-11-15 NOTE — PROGRESS NOTE ADULT - ASSESSMENT
DOS: 11/15/23    ATTENDING SURGEON: Lazarus Chaney MD    ASSISTANT: JOSE Bower    ANESTHESIA: Spinal + regional    PREOPERATIVE DIAGNOSIS: Left hip Osteoarthritis M16.1    POST OPERATIVE DIAGNOSIS: Left hip Osteoarthritis M16.1    OPERATION: Left Total hip arthoplasty    INSTRUMENTATION USED:   Telephone Accolade II Femoral 132 deg size 3  Styker Trident Acetabular cup size 54  MDM liner and poly +8    INDICATION FOR THE PROCEDURE: The patient presented with severe osteoarthritis of the hip and pain with ambulation during daily activities. Conservative management has failed to alleviate the pain. The patient was thus indicated for the above mentioned procedure.    All risks and benefits of the procedure were explained to the patient. The patient fully understood the procedure and freely consented.    PROCEDURE IN DETAIL:  The patient was taken to the operating room and after anesthetic induction, was placed onto the surgical table in a lateral decubitus position. The bony prominences were well padded and a pegboard was used position the body. The skin and operative site were prepped and draped in the usual sterile fashion. A proper timeout was performed prior to the incision.    An 11 blade was used to make 3 stable holes on the iliac crest and 3 parallel pins were inserted into the iliac crest and an array was placed. Next an incision was made over the posterolateral aspect of the femur both superior and inferior to the greater trochanter. The incision was deepened down to the fascia and IT band. The IT band was split parallel to the fibers and extended proximally along the fibers of the gluteus bharat. A charnley retractor was inserted to retract the bharat. Next the posterior aspect of the trochanter was exposed and checkpoint was placed. This was used to register the leg lengths.    Next the external rotators and piriformis were identified. The piriformis was preserved and the ER/conjoint tendon was released along with the capsule and tagged. The hip was dislocated and the planned neck cut was made. Next retractors were placed around the acetabulum  in order to expose it. The residual labrum was removed. A checkpoint was placed in the superior acetabulum and the acetabulum was registered. Once the proper registration was confirmed, reaming was performed robotically in the proper version abd abduction. There were excessive osteophytes both anterior and superior that were removed to prevent impingement.     The cup was implanted in the proper abduction and version and confirmed via the JOHN system. The poly liner was impacted in place. Attention was now turned to the femur. The femur was elevated and proper lateralization was performed. The femur was broached up to the planned size. Next trial reduction of the hip was performed and it was found to be stable at all physiologic ranges of motion. The Sensee software was used to check leg lengths and offset which was confirmed to match the preop plan.    The final femoral stem and head were placed and once again leg lengths and offset were confirmed. All the arrays and checkpoints were removed at this time. The wound was copiously irrigated and the deep fascia was closed with a barbed suture as was the subcutaneus layer and skin. A sterile occlusive dressing was placed. The patient was taken to the recovery room in stable condition. There were no complications during the procedure.  The assistance of a skilled physician assistant was required for the completion of the surgery. Due to the significant osteoarthritis, surrounding bone spurs, and elevated BMI, this procedure took 50% more time than usual.

## 2023-11-16 LAB
ANION GAP SERPL CALC-SCNC: 7 MMOL/L — SIGNIFICANT CHANGE UP (ref 5–17)
ANION GAP SERPL CALC-SCNC: 7 MMOL/L — SIGNIFICANT CHANGE UP (ref 5–17)
BUN SERPL-MCNC: 10 MG/DL — SIGNIFICANT CHANGE UP (ref 7–23)
BUN SERPL-MCNC: 10 MG/DL — SIGNIFICANT CHANGE UP (ref 7–23)
CALCIUM SERPL-MCNC: 8.9 MG/DL — SIGNIFICANT CHANGE UP (ref 8.5–10.1)
CALCIUM SERPL-MCNC: 8.9 MG/DL — SIGNIFICANT CHANGE UP (ref 8.5–10.1)
CHLORIDE SERPL-SCNC: 100 MMOL/L — SIGNIFICANT CHANGE UP (ref 96–108)
CHLORIDE SERPL-SCNC: 100 MMOL/L — SIGNIFICANT CHANGE UP (ref 96–108)
CO2 SERPL-SCNC: 25 MMOL/L — SIGNIFICANT CHANGE UP (ref 22–31)
CO2 SERPL-SCNC: 25 MMOL/L — SIGNIFICANT CHANGE UP (ref 22–31)
CREAT SERPL-MCNC: 0.83 MG/DL — SIGNIFICANT CHANGE UP (ref 0.5–1.3)
CREAT SERPL-MCNC: 0.83 MG/DL — SIGNIFICANT CHANGE UP (ref 0.5–1.3)
EGFR: 96 ML/MIN/1.73M2 — SIGNIFICANT CHANGE UP
EGFR: 96 ML/MIN/1.73M2 — SIGNIFICANT CHANGE UP
GLUCOSE SERPL-MCNC: 158 MG/DL — HIGH (ref 70–99)
GLUCOSE SERPL-MCNC: 158 MG/DL — HIGH (ref 70–99)
HCT VFR BLD CALC: 31 % — LOW (ref 39–50)
HCT VFR BLD CALC: 31 % — LOW (ref 39–50)
HGB BLD-MCNC: 11.1 G/DL — LOW (ref 13–17)
HGB BLD-MCNC: 11.1 G/DL — LOW (ref 13–17)
MAGNESIUM SERPL-MCNC: 1.7 MG/DL — SIGNIFICANT CHANGE UP (ref 1.6–2.6)
MAGNESIUM SERPL-MCNC: 1.7 MG/DL — SIGNIFICANT CHANGE UP (ref 1.6–2.6)
MCHC RBC-ENTMCNC: 32.2 PG — SIGNIFICANT CHANGE UP (ref 27–34)
MCHC RBC-ENTMCNC: 32.2 PG — SIGNIFICANT CHANGE UP (ref 27–34)
MCHC RBC-ENTMCNC: 35.8 G/DL — SIGNIFICANT CHANGE UP (ref 32–36)
MCHC RBC-ENTMCNC: 35.8 G/DL — SIGNIFICANT CHANGE UP (ref 32–36)
MCV RBC AUTO: 89.9 FL — SIGNIFICANT CHANGE UP (ref 80–100)
MCV RBC AUTO: 89.9 FL — SIGNIFICANT CHANGE UP (ref 80–100)
NRBC # BLD: 0 /100 WBCS — SIGNIFICANT CHANGE UP (ref 0–0)
NRBC # BLD: 0 /100 WBCS — SIGNIFICANT CHANGE UP (ref 0–0)
PHOSPHATE SERPL-MCNC: 3.2 MG/DL — SIGNIFICANT CHANGE UP (ref 2.5–4.5)
PHOSPHATE SERPL-MCNC: 3.2 MG/DL — SIGNIFICANT CHANGE UP (ref 2.5–4.5)
PLATELET # BLD AUTO: 204 K/UL — SIGNIFICANT CHANGE UP (ref 150–400)
PLATELET # BLD AUTO: 204 K/UL — SIGNIFICANT CHANGE UP (ref 150–400)
POTASSIUM SERPL-MCNC: 4.4 MMOL/L — SIGNIFICANT CHANGE UP (ref 3.5–5.3)
POTASSIUM SERPL-MCNC: 4.4 MMOL/L — SIGNIFICANT CHANGE UP (ref 3.5–5.3)
POTASSIUM SERPL-SCNC: 4.4 MMOL/L — SIGNIFICANT CHANGE UP (ref 3.5–5.3)
POTASSIUM SERPL-SCNC: 4.4 MMOL/L — SIGNIFICANT CHANGE UP (ref 3.5–5.3)
RBC # BLD: 3.45 M/UL — LOW (ref 4.2–5.8)
RBC # BLD: 3.45 M/UL — LOW (ref 4.2–5.8)
RBC # FLD: 12.3 % — SIGNIFICANT CHANGE UP (ref 10.3–14.5)
RBC # FLD: 12.3 % — SIGNIFICANT CHANGE UP (ref 10.3–14.5)
SODIUM SERPL-SCNC: 132 MMOL/L — LOW (ref 135–145)
SODIUM SERPL-SCNC: 132 MMOL/L — LOW (ref 135–145)
WBC # BLD: 12.37 K/UL — HIGH (ref 3.8–10.5)
WBC # BLD: 12.37 K/UL — HIGH (ref 3.8–10.5)
WBC # FLD AUTO: 12.37 K/UL — HIGH (ref 3.8–10.5)
WBC # FLD AUTO: 12.37 K/UL — HIGH (ref 3.8–10.5)

## 2023-11-16 RX ORDER — DIAZEPAM 5 MG
2 TABLET ORAL ONCE
Refills: 0 | Status: DISCONTINUED | OUTPATIENT
Start: 2023-11-16 | End: 2023-11-16

## 2023-11-16 RX ORDER — LANOLIN ALCOHOL/MO/W.PET/CERES
3 CREAM (GRAM) TOPICAL AT BEDTIME
Refills: 0 | Status: DISCONTINUED | OUTPATIENT
Start: 2023-11-16 | End: 2023-11-20

## 2023-11-16 RX ORDER — APIXABAN 2.5 MG/1
2.5 TABLET, FILM COATED ORAL
Refills: 0 | Status: DISCONTINUED | OUTPATIENT
Start: 2023-11-16 | End: 2023-11-20

## 2023-11-16 RX ORDER — ZOLPIDEM TARTRATE 10 MG/1
5 TABLET ORAL AT BEDTIME
Refills: 0 | Status: DISCONTINUED | OUTPATIENT
Start: 2023-11-16 | End: 2023-11-20

## 2023-11-16 RX ORDER — BENZOCAINE AND MENTHOL 5; 1 G/100ML; G/100ML
1 LIQUID ORAL THREE TIMES A DAY
Refills: 0 | Status: DISCONTINUED | OUTPATIENT
Start: 2023-11-16 | End: 2023-11-20

## 2023-11-16 RX ORDER — OXYCODONE HYDROCHLORIDE 5 MG/1
20 TABLET ORAL EVERY 4 HOURS
Refills: 0 | Status: DISCONTINUED | OUTPATIENT
Start: 2023-11-16 | End: 2023-11-20

## 2023-11-16 RX ORDER — ACETAMINOPHEN 500 MG
1000 TABLET ORAL ONCE
Refills: 0 | Status: DISCONTINUED | OUTPATIENT
Start: 2023-11-16 | End: 2023-11-20

## 2023-11-16 RX ORDER — OXYCODONE HYDROCHLORIDE 5 MG/1
25 TABLET ORAL EVERY 4 HOURS
Refills: 0 | Status: DISCONTINUED | OUTPATIENT
Start: 2023-11-16 | End: 2023-11-20

## 2023-11-16 RX ADMIN — Medication 650 MILLIGRAM(S): at 06:50

## 2023-11-16 RX ADMIN — SODIUM CHLORIDE 115 MILLILITER(S): 9 INJECTION, SOLUTION INTRAVENOUS at 06:49

## 2023-11-16 RX ADMIN — Medication 20 MILLIGRAM(S): at 06:51

## 2023-11-16 RX ADMIN — Medication 25 MILLIGRAM(S): at 06:49

## 2023-11-16 RX ADMIN — OXYCODONE HYDROCHLORIDE 20 MILLIGRAM(S): 5 TABLET ORAL at 07:46

## 2023-11-16 RX ADMIN — SODIUM CHLORIDE 3 MILLILITER(S): 9 INJECTION INTRAMUSCULAR; INTRAVENOUS; SUBCUTANEOUS at 07:10

## 2023-11-16 RX ADMIN — Medication 15 MILLIGRAM(S): at 12:08

## 2023-11-16 RX ADMIN — OXYCODONE HYDROCHLORIDE 20 MILLIGRAM(S): 5 TABLET ORAL at 00:20

## 2023-11-16 RX ADMIN — Medication 1 TABLET(S): at 12:07

## 2023-11-16 RX ADMIN — OXYCODONE HYDROCHLORIDE 25 MILLIGRAM(S): 5 TABLET ORAL at 21:45

## 2023-11-16 RX ADMIN — Medication 650 MILLIGRAM(S): at 12:51

## 2023-11-16 RX ADMIN — OXYCODONE HYDROCHLORIDE 25 MILLIGRAM(S): 5 TABLET ORAL at 20:49

## 2023-11-16 RX ADMIN — Medication 500 MILLIGRAM(S): at 18:32

## 2023-11-16 RX ADMIN — OXYCODONE HYDROCHLORIDE 25 MILLIGRAM(S): 5 TABLET ORAL at 12:08

## 2023-11-16 RX ADMIN — Medication 650 MILLIGRAM(S): at 12:07

## 2023-11-16 RX ADMIN — ZOLPIDEM TARTRATE 5 MILLIGRAM(S): 10 TABLET ORAL at 21:50

## 2023-11-16 RX ADMIN — APIXABAN 2.5 MILLIGRAM(S): 2.5 TABLET, FILM COATED ORAL at 18:33

## 2023-11-16 RX ADMIN — Medication 15 MILLIGRAM(S): at 01:25

## 2023-11-16 RX ADMIN — Medication 650 MILLIGRAM(S): at 19:11

## 2023-11-16 RX ADMIN — CELECOXIB 200 MILLIGRAM(S): 200 CAPSULE ORAL at 18:32

## 2023-11-16 RX ADMIN — Medication 15 MILLIGRAM(S): at 07:50

## 2023-11-16 RX ADMIN — OXYCODONE HYDROCHLORIDE 20 MILLIGRAM(S): 5 TABLET ORAL at 03:27

## 2023-11-16 RX ADMIN — Medication 15 MILLIGRAM(S): at 12:24

## 2023-11-16 RX ADMIN — Medication 81 MILLIGRAM(S): at 06:50

## 2023-11-16 RX ADMIN — CELECOXIB 200 MILLIGRAM(S): 200 CAPSULE ORAL at 06:51

## 2023-11-16 RX ADMIN — Medication 650 MILLIGRAM(S): at 07:50

## 2023-11-16 RX ADMIN — Medication 500 MILLIGRAM(S): at 06:50

## 2023-11-16 RX ADMIN — OXYCODONE HYDROCHLORIDE 20 MILLIGRAM(S): 5 TABLET ORAL at 04:25

## 2023-11-16 RX ADMIN — CELECOXIB 200 MILLIGRAM(S): 200 CAPSULE ORAL at 07:50

## 2023-11-16 RX ADMIN — Medication 1000 MILLIGRAM(S): at 00:20

## 2023-11-16 RX ADMIN — Medication 15 MILLIGRAM(S): at 18:48

## 2023-11-16 RX ADMIN — Medication 15 MILLIGRAM(S): at 06:50

## 2023-11-16 RX ADMIN — CYCLOBENZAPRINE HYDROCHLORIDE 10 MILLIGRAM(S): 10 TABLET, FILM COATED ORAL at 01:16

## 2023-11-16 RX ADMIN — SODIUM CHLORIDE 3 MILLILITER(S): 9 INJECTION INTRAMUSCULAR; INTRAVENOUS; SUBCUTANEOUS at 21:59

## 2023-11-16 RX ADMIN — ZOLPIDEM TARTRATE 5 MILLIGRAM(S): 10 TABLET ORAL at 23:18

## 2023-11-16 RX ADMIN — OXYCODONE HYDROCHLORIDE 25 MILLIGRAM(S): 5 TABLET ORAL at 12:51

## 2023-11-16 RX ADMIN — Medication 15 MILLIGRAM(S): at 18:33

## 2023-11-16 RX ADMIN — Medication 650 MILLIGRAM(S): at 18:32

## 2023-11-16 RX ADMIN — Medication 2 MILLIGRAM(S): at 09:02

## 2023-11-16 RX ADMIN — LOSARTAN POTASSIUM 50 MILLIGRAM(S): 100 TABLET, FILM COATED ORAL at 06:50

## 2023-11-16 RX ADMIN — HYDROMORPHONE HYDROCHLORIDE 0.5 MILLIGRAM(S): 2 INJECTION INTRAMUSCULAR; INTRAVENOUS; SUBCUTANEOUS at 16:11

## 2023-11-16 RX ADMIN — OXYCODONE HYDROCHLORIDE 25 MILLIGRAM(S): 5 TABLET ORAL at 16:41

## 2023-11-16 RX ADMIN — CELECOXIB 200 MILLIGRAM(S): 200 CAPSULE ORAL at 19:12

## 2023-11-16 RX ADMIN — OXYCODONE HYDROCHLORIDE 20 MILLIGRAM(S): 5 TABLET ORAL at 08:45

## 2023-11-16 RX ADMIN — Medication 650 MILLIGRAM(S): at 23:18

## 2023-11-16 RX ADMIN — Medication 102 MILLIGRAM(S): at 06:49

## 2023-11-16 RX ADMIN — PANTOPRAZOLE SODIUM 40 MILLIGRAM(S): 20 TABLET, DELAYED RELEASE ORAL at 06:54

## 2023-11-16 RX ADMIN — Medication 15 MILLIGRAM(S): at 00:26

## 2023-11-16 RX ADMIN — HYDROMORPHONE HYDROCHLORIDE 0.5 MILLIGRAM(S): 2 INJECTION INTRAMUSCULAR; INTRAVENOUS; SUBCUTANEOUS at 15:56

## 2023-11-16 RX ADMIN — Medication 100 MILLIGRAM(S): at 06:55

## 2023-11-16 RX ADMIN — OXYCODONE HYDROCHLORIDE 25 MILLIGRAM(S): 5 TABLET ORAL at 17:38

## 2023-11-16 NOTE — OCCUPATIONAL THERAPY INITIAL EVALUATION ADULT - STRENGTHENING, PT EVAL
Pt will increase BLE lower extremity strength to 5/5 to improve functional strength needed to engage in functional tasks by 4 weeks

## 2023-11-16 NOTE — PHYSICAL THERAPY INITIAL EVALUATION ADULT - PERTINENT HX OF CURRENT PROBLEM, REHAB EVAL
69yo male with medical h/o HTN, Aflutter on Xarelto, Pedal edema-bilateral, and OA left hip, c/o left hip pain presents today for PST for scheduled Robotic-assisted Left Total Hip Replacement

## 2023-11-16 NOTE — OCCUPATIONAL THERAPY INITIAL EVALUATION ADULT - GENERAL OBSERVATIONS, REHAB EVAL
Pt was encountered supine in bed; NAD, PIV +, S/P L THR posterior approach POD 1, LLE WBAT, L hip dressing clean, dry and intact, posterior hip precautions, alert, anxious, followed commands; pt c/o pain in L hip which limits pt performance with functional ADL's/transfers and mobility; PT Camryn present.

## 2023-11-16 NOTE — OCCUPATIONAL THERAPY INITIAL EVALUATION ADULT - RANGE OF MOTION, PT EVAL
Pt will have WFL L Hip and knee ROM (within posterior approach precautions) in order to perform mobility independently within 6-8 weeks

## 2023-11-16 NOTE — PHYSICAL THERAPY INITIAL EVALUATION ADULT - ADDITIONAL COMMENTS
verified post op: Pt lives with his wife (whom can provide assist upon D/C home) in a private home, 3 entry step (no rail), 4 steps inside c R rail down to where the patient's living area. Pt has a walk in shower stall with a fixed shower head, standard toilet seat height, & no grab bar. Pt states he is currently independent with household ambulation with a standard walker. Pt also owns rollator, straight cane and quad cane at home. Pt states he is independent with ADL's with a standard walker. Pt reports daily 8/10 pain at rest & states it is worse with any activity 10/10. Pt is left hand dominant, doesn't drive, & is retired.  Pt endorses taking oxycodone for pain management. Goal of therapy: manage pain & improve functional mobility.

## 2023-11-16 NOTE — PHYSICAL THERAPY INITIAL EVALUATION ADULT - ACTIVE RANGE OF MOTION EXAMINATION, REHAB EVAL
limited mobility to BLE secodnary to increased pain/bilateral upper extremity Active ROM was WFL (within functional limits)

## 2023-11-16 NOTE — OCCUPATIONAL THERAPY INITIAL EVALUATION ADULT - RANGE OF MOTION EXAMINATION, LOWER EXTREMITY
LLE AROM hip and knee flexion grossly impaired; LLE AROM distally to knee grossly WFL./Right LE Active ROM was WFL   (within functional limits)

## 2023-11-16 NOTE — OCCUPATIONAL THERAPY INITIAL EVALUATION ADULT - ADDITIONAL COMMENTS
Pt lives with his wife (whom can provide assist upon D/C home) in a private home, 3 entry step (no rail), 4 steps inside with R rail down to where the patient's living area. Pt has a walk in shower stall with a fixed shower head, standard toilet seat height, & no grab bar. Pt states he is currently independent with household ambulation with a standard walker. Pt also owns rollator, straight cane and quad cane at home. Pt states he is independent with ADL's with a standard walker. Pt reports daily 8/10 pain at rest & states it is worse with any activity 10/10. Pt is left hand dominant, doesn't drive, & is retired.  Pt endorses taking oxycodone for pain management. Goal of therapy: manage pain & improve functional mobility

## 2023-11-16 NOTE — OCCUPATIONAL THERAPY INITIAL EVALUATION ADULT - RANGE OF MOTION EXAMINATION, UPPER EXTREMITY
Pt admitted with acute cystitis with hematauria bilateral UE Active ROM was WFL  (within functional limits)

## 2023-11-16 NOTE — PHYSICAL THERAPY INITIAL EVALUATION ADULT - GENERAL OBSERVATIONS, REHAB EVAL
pt encountered semi-reclined in bed, alert and oriented x4, iv in place. Pt is POD#1 L GINNA WBAT, posterior precautions in place.

## 2023-11-17 LAB
ANION GAP SERPL CALC-SCNC: 7 MMOL/L — SIGNIFICANT CHANGE UP (ref 5–17)
ANION GAP SERPL CALC-SCNC: 7 MMOL/L — SIGNIFICANT CHANGE UP (ref 5–17)
BUN SERPL-MCNC: 11 MG/DL — SIGNIFICANT CHANGE UP (ref 7–23)
BUN SERPL-MCNC: 11 MG/DL — SIGNIFICANT CHANGE UP (ref 7–23)
CALCIUM SERPL-MCNC: 8.8 MG/DL — SIGNIFICANT CHANGE UP (ref 8.5–10.1)
CALCIUM SERPL-MCNC: 8.8 MG/DL — SIGNIFICANT CHANGE UP (ref 8.5–10.1)
CHLORIDE SERPL-SCNC: 103 MMOL/L — SIGNIFICANT CHANGE UP (ref 96–108)
CHLORIDE SERPL-SCNC: 103 MMOL/L — SIGNIFICANT CHANGE UP (ref 96–108)
CO2 SERPL-SCNC: 26 MMOL/L — SIGNIFICANT CHANGE UP (ref 22–31)
CO2 SERPL-SCNC: 26 MMOL/L — SIGNIFICANT CHANGE UP (ref 22–31)
CREAT SERPL-MCNC: 0.76 MG/DL — SIGNIFICANT CHANGE UP (ref 0.5–1.3)
CREAT SERPL-MCNC: 0.76 MG/DL — SIGNIFICANT CHANGE UP (ref 0.5–1.3)
EGFR: 99 ML/MIN/1.73M2 — SIGNIFICANT CHANGE UP
EGFR: 99 ML/MIN/1.73M2 — SIGNIFICANT CHANGE UP
GLUCOSE SERPL-MCNC: 179 MG/DL — HIGH (ref 70–99)
GLUCOSE SERPL-MCNC: 179 MG/DL — HIGH (ref 70–99)
HCT VFR BLD CALC: 26.2 % — LOW (ref 39–50)
HCT VFR BLD CALC: 26.2 % — LOW (ref 39–50)
HGB BLD-MCNC: 9 G/DL — LOW (ref 13–17)
HGB BLD-MCNC: 9 G/DL — LOW (ref 13–17)
MCHC RBC-ENTMCNC: 31.3 PG — SIGNIFICANT CHANGE UP (ref 27–34)
MCHC RBC-ENTMCNC: 31.3 PG — SIGNIFICANT CHANGE UP (ref 27–34)
MCHC RBC-ENTMCNC: 34.4 G/DL — SIGNIFICANT CHANGE UP (ref 32–36)
MCHC RBC-ENTMCNC: 34.4 G/DL — SIGNIFICANT CHANGE UP (ref 32–36)
MCV RBC AUTO: 91 FL — SIGNIFICANT CHANGE UP (ref 80–100)
MCV RBC AUTO: 91 FL — SIGNIFICANT CHANGE UP (ref 80–100)
NRBC # BLD: 0 /100 WBCS — SIGNIFICANT CHANGE UP (ref 0–0)
NRBC # BLD: 0 /100 WBCS — SIGNIFICANT CHANGE UP (ref 0–0)
PLATELET # BLD AUTO: 193 K/UL — SIGNIFICANT CHANGE UP (ref 150–400)
PLATELET # BLD AUTO: 193 K/UL — SIGNIFICANT CHANGE UP (ref 150–400)
POTASSIUM SERPL-MCNC: 3.6 MMOL/L — SIGNIFICANT CHANGE UP (ref 3.5–5.3)
POTASSIUM SERPL-MCNC: 3.6 MMOL/L — SIGNIFICANT CHANGE UP (ref 3.5–5.3)
POTASSIUM SERPL-SCNC: 3.6 MMOL/L — SIGNIFICANT CHANGE UP (ref 3.5–5.3)
POTASSIUM SERPL-SCNC: 3.6 MMOL/L — SIGNIFICANT CHANGE UP (ref 3.5–5.3)
RBC # BLD: 2.88 M/UL — LOW (ref 4.2–5.8)
RBC # BLD: 2.88 M/UL — LOW (ref 4.2–5.8)
RBC # FLD: 12.8 % — SIGNIFICANT CHANGE UP (ref 10.3–14.5)
RBC # FLD: 12.8 % — SIGNIFICANT CHANGE UP (ref 10.3–14.5)
SODIUM SERPL-SCNC: 136 MMOL/L — SIGNIFICANT CHANGE UP (ref 135–145)
SODIUM SERPL-SCNC: 136 MMOL/L — SIGNIFICANT CHANGE UP (ref 135–145)
WBC # BLD: 10.87 K/UL — HIGH (ref 3.8–10.5)
WBC # BLD: 10.87 K/UL — HIGH (ref 3.8–10.5)
WBC # FLD AUTO: 10.87 K/UL — HIGH (ref 3.8–10.5)
WBC # FLD AUTO: 10.87 K/UL — HIGH (ref 3.8–10.5)

## 2023-11-17 RX ORDER — ACETAMINOPHEN 500 MG
1000 TABLET ORAL ONCE
Refills: 0 | Status: DISCONTINUED | OUTPATIENT
Start: 2023-11-17 | End: 2023-11-20

## 2023-11-17 RX ORDER — LANOLIN ALCOHOL/MO/W.PET/CERES
3 CREAM (GRAM) TOPICAL AT BEDTIME
Refills: 0 | Status: DISCONTINUED | OUTPATIENT
Start: 2023-11-17 | End: 2023-11-20

## 2023-11-17 RX ADMIN — Medication 650 MILLIGRAM(S): at 23:55

## 2023-11-17 RX ADMIN — ZOLPIDEM TARTRATE 5 MILLIGRAM(S): 10 TABLET ORAL at 23:55

## 2023-11-17 RX ADMIN — OXYCODONE HYDROCHLORIDE 25 MILLIGRAM(S): 5 TABLET ORAL at 03:10

## 2023-11-17 RX ADMIN — OXYCODONE HYDROCHLORIDE 25 MILLIGRAM(S): 5 TABLET ORAL at 06:47

## 2023-11-17 RX ADMIN — CELECOXIB 200 MILLIGRAM(S): 200 CAPSULE ORAL at 20:28

## 2023-11-17 RX ADMIN — PANTOPRAZOLE SODIUM 40 MILLIGRAM(S): 20 TABLET, DELAYED RELEASE ORAL at 06:47

## 2023-11-17 RX ADMIN — Medication 650 MILLIGRAM(S): at 06:47

## 2023-11-17 RX ADMIN — APIXABAN 2.5 MILLIGRAM(S): 2.5 TABLET, FILM COATED ORAL at 19:23

## 2023-11-17 RX ADMIN — Medication 500 MILLIGRAM(S): at 06:46

## 2023-11-17 RX ADMIN — Medication 650 MILLIGRAM(S): at 00:15

## 2023-11-17 RX ADMIN — Medication 650 MILLIGRAM(S): at 19:23

## 2023-11-17 RX ADMIN — CELECOXIB 200 MILLIGRAM(S): 200 CAPSULE ORAL at 07:45

## 2023-11-17 RX ADMIN — Medication 650 MILLIGRAM(S): at 12:04

## 2023-11-17 RX ADMIN — OXYCODONE HYDROCHLORIDE 25 MILLIGRAM(S): 5 TABLET ORAL at 07:46

## 2023-11-17 RX ADMIN — LOSARTAN POTASSIUM 50 MILLIGRAM(S): 100 TABLET, FILM COATED ORAL at 06:46

## 2023-11-17 RX ADMIN — CYCLOBENZAPRINE HYDROCHLORIDE 10 MILLIGRAM(S): 10 TABLET, FILM COATED ORAL at 23:55

## 2023-11-17 RX ADMIN — SODIUM CHLORIDE 3 MILLILITER(S): 9 INJECTION INTRAMUSCULAR; INTRAVENOUS; SUBCUTANEOUS at 14:59

## 2023-11-17 RX ADMIN — HYDROMORPHONE HYDROCHLORIDE 0.5 MILLIGRAM(S): 2 INJECTION INTRAMUSCULAR; INTRAVENOUS; SUBCUTANEOUS at 10:10

## 2023-11-17 RX ADMIN — Medication 25 MILLIGRAM(S): at 06:47

## 2023-11-17 RX ADMIN — Medication 500 MILLIGRAM(S): at 19:23

## 2023-11-17 RX ADMIN — Medication 650 MILLIGRAM(S): at 11:04

## 2023-11-17 RX ADMIN — APIXABAN 2.5 MILLIGRAM(S): 2.5 TABLET, FILM COATED ORAL at 06:47

## 2023-11-17 RX ADMIN — Medication 20 MILLIGRAM(S): at 06:54

## 2023-11-17 RX ADMIN — Medication 650 MILLIGRAM(S): at 20:23

## 2023-11-17 RX ADMIN — HYDROMORPHONE HYDROCHLORIDE 0.5 MILLIGRAM(S): 2 INJECTION INTRAMUSCULAR; INTRAVENOUS; SUBCUTANEOUS at 09:54

## 2023-11-17 RX ADMIN — OXYCODONE HYDROCHLORIDE 25 MILLIGRAM(S): 5 TABLET ORAL at 11:04

## 2023-11-17 RX ADMIN — SODIUM CHLORIDE 3 MILLILITER(S): 9 INJECTION INTRAMUSCULAR; INTRAVENOUS; SUBCUTANEOUS at 06:49

## 2023-11-17 RX ADMIN — OXYCODONE HYDROCHLORIDE 25 MILLIGRAM(S): 5 TABLET ORAL at 19:22

## 2023-11-17 RX ADMIN — OXYCODONE HYDROCHLORIDE 25 MILLIGRAM(S): 5 TABLET ORAL at 15:22

## 2023-11-17 RX ADMIN — CELECOXIB 200 MILLIGRAM(S): 200 CAPSULE ORAL at 06:47

## 2023-11-17 RX ADMIN — CELECOXIB 200 MILLIGRAM(S): 200 CAPSULE ORAL at 19:23

## 2023-11-17 RX ADMIN — Medication 650 MILLIGRAM(S): at 07:45

## 2023-11-17 RX ADMIN — OXYCODONE HYDROCHLORIDE 25 MILLIGRAM(S): 5 TABLET ORAL at 20:28

## 2023-11-17 RX ADMIN — OXYCODONE HYDROCHLORIDE 25 MILLIGRAM(S): 5 TABLET ORAL at 23:56

## 2023-11-17 RX ADMIN — SODIUM CHLORIDE 3 MILLILITER(S): 9 INJECTION INTRAMUSCULAR; INTRAVENOUS; SUBCUTANEOUS at 22:23

## 2023-11-17 RX ADMIN — OXYCODONE HYDROCHLORIDE 25 MILLIGRAM(S): 5 TABLET ORAL at 12:04

## 2023-11-17 RX ADMIN — Medication 1 TABLET(S): at 11:04

## 2023-11-17 RX ADMIN — OXYCODONE HYDROCHLORIDE 25 MILLIGRAM(S): 5 TABLET ORAL at 02:10

## 2023-11-17 RX ADMIN — OXYCODONE HYDROCHLORIDE 25 MILLIGRAM(S): 5 TABLET ORAL at 16:22

## 2023-11-18 LAB
ANION GAP SERPL CALC-SCNC: 7 MMOL/L — SIGNIFICANT CHANGE UP (ref 5–17)
ANION GAP SERPL CALC-SCNC: 7 MMOL/L — SIGNIFICANT CHANGE UP (ref 5–17)
BUN SERPL-MCNC: 14 MG/DL — SIGNIFICANT CHANGE UP (ref 7–23)
BUN SERPL-MCNC: 14 MG/DL — SIGNIFICANT CHANGE UP (ref 7–23)
CALCIUM SERPL-MCNC: 8.4 MG/DL — LOW (ref 8.5–10.1)
CALCIUM SERPL-MCNC: 8.4 MG/DL — LOW (ref 8.5–10.1)
CHLORIDE SERPL-SCNC: 107 MMOL/L — SIGNIFICANT CHANGE UP (ref 96–108)
CHLORIDE SERPL-SCNC: 107 MMOL/L — SIGNIFICANT CHANGE UP (ref 96–108)
CO2 SERPL-SCNC: 26 MMOL/L — SIGNIFICANT CHANGE UP (ref 22–31)
CO2 SERPL-SCNC: 26 MMOL/L — SIGNIFICANT CHANGE UP (ref 22–31)
CREAT SERPL-MCNC: 0.76 MG/DL — SIGNIFICANT CHANGE UP (ref 0.5–1.3)
CREAT SERPL-MCNC: 0.76 MG/DL — SIGNIFICANT CHANGE UP (ref 0.5–1.3)
EGFR: 99 ML/MIN/1.73M2 — SIGNIFICANT CHANGE UP
EGFR: 99 ML/MIN/1.73M2 — SIGNIFICANT CHANGE UP
GLUCOSE SERPL-MCNC: 202 MG/DL — HIGH (ref 70–99)
GLUCOSE SERPL-MCNC: 202 MG/DL — HIGH (ref 70–99)
HCT VFR BLD CALC: 21.8 % — LOW (ref 39–50)
HCT VFR BLD CALC: 21.8 % — LOW (ref 39–50)
HGB BLD-MCNC: 7.4 G/DL — LOW (ref 13–17)
HGB BLD-MCNC: 7.4 G/DL — LOW (ref 13–17)
MCHC RBC-ENTMCNC: 31.6 PG — SIGNIFICANT CHANGE UP (ref 27–34)
MCHC RBC-ENTMCNC: 31.6 PG — SIGNIFICANT CHANGE UP (ref 27–34)
MCHC RBC-ENTMCNC: 33.9 G/DL — SIGNIFICANT CHANGE UP (ref 32–36)
MCHC RBC-ENTMCNC: 33.9 G/DL — SIGNIFICANT CHANGE UP (ref 32–36)
MCV RBC AUTO: 93.2 FL — SIGNIFICANT CHANGE UP (ref 80–100)
MCV RBC AUTO: 93.2 FL — SIGNIFICANT CHANGE UP (ref 80–100)
NRBC # BLD: 0 /100 WBCS — SIGNIFICANT CHANGE UP (ref 0–0)
NRBC # BLD: 0 /100 WBCS — SIGNIFICANT CHANGE UP (ref 0–0)
PLATELET # BLD AUTO: 140 K/UL — LOW (ref 150–400)
PLATELET # BLD AUTO: 140 K/UL — LOW (ref 150–400)
POTASSIUM SERPL-MCNC: 4 MMOL/L — SIGNIFICANT CHANGE UP (ref 3.5–5.3)
POTASSIUM SERPL-MCNC: 4 MMOL/L — SIGNIFICANT CHANGE UP (ref 3.5–5.3)
POTASSIUM SERPL-SCNC: 4 MMOL/L — SIGNIFICANT CHANGE UP (ref 3.5–5.3)
POTASSIUM SERPL-SCNC: 4 MMOL/L — SIGNIFICANT CHANGE UP (ref 3.5–5.3)
RBC # BLD: 2.34 M/UL — LOW (ref 4.2–5.8)
RBC # BLD: 2.34 M/UL — LOW (ref 4.2–5.8)
RBC # FLD: 12.6 % — SIGNIFICANT CHANGE UP (ref 10.3–14.5)
RBC # FLD: 12.6 % — SIGNIFICANT CHANGE UP (ref 10.3–14.5)
SODIUM SERPL-SCNC: 140 MMOL/L — SIGNIFICANT CHANGE UP (ref 135–145)
SODIUM SERPL-SCNC: 140 MMOL/L — SIGNIFICANT CHANGE UP (ref 135–145)
WBC # BLD: 5.21 K/UL — SIGNIFICANT CHANGE UP (ref 3.8–10.5)
WBC # BLD: 5.21 K/UL — SIGNIFICANT CHANGE UP (ref 3.8–10.5)
WBC # FLD AUTO: 5.21 K/UL — SIGNIFICANT CHANGE UP (ref 3.8–10.5)
WBC # FLD AUTO: 5.21 K/UL — SIGNIFICANT CHANGE UP (ref 3.8–10.5)

## 2023-11-18 RX ADMIN — OXYCODONE HYDROCHLORIDE 25 MILLIGRAM(S): 5 TABLET ORAL at 06:11

## 2023-11-18 RX ADMIN — SODIUM CHLORIDE 3 MILLILITER(S): 9 INJECTION INTRAMUSCULAR; INTRAVENOUS; SUBCUTANEOUS at 14:36

## 2023-11-18 RX ADMIN — PANTOPRAZOLE SODIUM 40 MILLIGRAM(S): 20 TABLET, DELAYED RELEASE ORAL at 06:12

## 2023-11-18 RX ADMIN — Medication 650 MILLIGRAM(S): at 11:29

## 2023-11-18 RX ADMIN — Medication 650 MILLIGRAM(S): at 12:29

## 2023-11-18 RX ADMIN — Medication 650 MILLIGRAM(S): at 17:12

## 2023-11-18 RX ADMIN — CELECOXIB 200 MILLIGRAM(S): 200 CAPSULE ORAL at 18:05

## 2023-11-18 RX ADMIN — OXYCODONE HYDROCHLORIDE 25 MILLIGRAM(S): 5 TABLET ORAL at 11:29

## 2023-11-18 RX ADMIN — APIXABAN 2.5 MILLIGRAM(S): 2.5 TABLET, FILM COATED ORAL at 06:12

## 2023-11-18 RX ADMIN — SODIUM CHLORIDE 3 MILLILITER(S): 9 INJECTION INTRAMUSCULAR; INTRAVENOUS; SUBCUTANEOUS at 06:04

## 2023-11-18 RX ADMIN — OXYCODONE HYDROCHLORIDE 25 MILLIGRAM(S): 5 TABLET ORAL at 12:29

## 2023-11-18 RX ADMIN — Medication 500 MILLIGRAM(S): at 06:12

## 2023-11-18 RX ADMIN — CELECOXIB 200 MILLIGRAM(S): 200 CAPSULE ORAL at 07:08

## 2023-11-18 RX ADMIN — LOSARTAN POTASSIUM 50 MILLIGRAM(S): 100 TABLET, FILM COATED ORAL at 06:12

## 2023-11-18 RX ADMIN — Medication 1 TABLET(S): at 11:29

## 2023-11-18 RX ADMIN — APIXABAN 2.5 MILLIGRAM(S): 2.5 TABLET, FILM COATED ORAL at 17:12

## 2023-11-18 RX ADMIN — CELECOXIB 200 MILLIGRAM(S): 200 CAPSULE ORAL at 06:13

## 2023-11-18 RX ADMIN — OXYCODONE HYDROCHLORIDE 25 MILLIGRAM(S): 5 TABLET ORAL at 19:05

## 2023-11-18 RX ADMIN — Medication 500 MILLIGRAM(S): at 17:12

## 2023-11-18 RX ADMIN — OXYCODONE HYDROCHLORIDE 25 MILLIGRAM(S): 5 TABLET ORAL at 18:05

## 2023-11-18 RX ADMIN — Medication 650 MILLIGRAM(S): at 18:05

## 2023-11-18 RX ADMIN — Medication 25 MILLIGRAM(S): at 06:12

## 2023-11-18 RX ADMIN — OXYCODONE HYDROCHLORIDE 25 MILLIGRAM(S): 5 TABLET ORAL at 07:11

## 2023-11-18 RX ADMIN — SODIUM CHLORIDE 3 MILLILITER(S): 9 INJECTION INTRAMUSCULAR; INTRAVENOUS; SUBCUTANEOUS at 21:29

## 2023-11-18 RX ADMIN — OXYCODONE HYDROCHLORIDE 25 MILLIGRAM(S): 5 TABLET ORAL at 00:56

## 2023-11-18 RX ADMIN — OXYCODONE HYDROCHLORIDE 25 MILLIGRAM(S): 5 TABLET ORAL at 23:15

## 2023-11-18 RX ADMIN — Medication 650 MILLIGRAM(S): at 00:55

## 2023-11-18 RX ADMIN — CELECOXIB 200 MILLIGRAM(S): 200 CAPSULE ORAL at 17:12

## 2023-11-18 RX ADMIN — Medication 20 MILLIGRAM(S): at 06:11

## 2023-11-19 LAB
ANION GAP SERPL CALC-SCNC: 6 MMOL/L — SIGNIFICANT CHANGE UP (ref 5–17)
ANION GAP SERPL CALC-SCNC: 6 MMOL/L — SIGNIFICANT CHANGE UP (ref 5–17)
BUN SERPL-MCNC: 15 MG/DL — SIGNIFICANT CHANGE UP (ref 7–23)
BUN SERPL-MCNC: 15 MG/DL — SIGNIFICANT CHANGE UP (ref 7–23)
CALCIUM SERPL-MCNC: 8.4 MG/DL — LOW (ref 8.5–10.1)
CALCIUM SERPL-MCNC: 8.4 MG/DL — LOW (ref 8.5–10.1)
CHLORIDE SERPL-SCNC: 106 MMOL/L — SIGNIFICANT CHANGE UP (ref 96–108)
CHLORIDE SERPL-SCNC: 106 MMOL/L — SIGNIFICANT CHANGE UP (ref 96–108)
CO2 SERPL-SCNC: 27 MMOL/L — SIGNIFICANT CHANGE UP (ref 22–31)
CO2 SERPL-SCNC: 27 MMOL/L — SIGNIFICANT CHANGE UP (ref 22–31)
CREAT SERPL-MCNC: 0.69 MG/DL — SIGNIFICANT CHANGE UP (ref 0.5–1.3)
CREAT SERPL-MCNC: 0.69 MG/DL — SIGNIFICANT CHANGE UP (ref 0.5–1.3)
EGFR: 101 ML/MIN/1.73M2 — SIGNIFICANT CHANGE UP
EGFR: 101 ML/MIN/1.73M2 — SIGNIFICANT CHANGE UP
GLUCOSE SERPL-MCNC: 143 MG/DL — HIGH (ref 70–99)
GLUCOSE SERPL-MCNC: 143 MG/DL — HIGH (ref 70–99)
HCT VFR BLD CALC: 27 % — LOW (ref 39–50)
HCT VFR BLD CALC: 27 % — LOW (ref 39–50)
HGB BLD-MCNC: 9.3 G/DL — LOW (ref 13–17)
HGB BLD-MCNC: 9.3 G/DL — LOW (ref 13–17)
MCHC RBC-ENTMCNC: 31.6 PG — SIGNIFICANT CHANGE UP (ref 27–34)
MCHC RBC-ENTMCNC: 31.6 PG — SIGNIFICANT CHANGE UP (ref 27–34)
MCHC RBC-ENTMCNC: 34.4 G/DL — SIGNIFICANT CHANGE UP (ref 32–36)
MCHC RBC-ENTMCNC: 34.4 G/DL — SIGNIFICANT CHANGE UP (ref 32–36)
MCV RBC AUTO: 91.8 FL — SIGNIFICANT CHANGE UP (ref 80–100)
MCV RBC AUTO: 91.8 FL — SIGNIFICANT CHANGE UP (ref 80–100)
NRBC # BLD: 0 /100 WBCS — SIGNIFICANT CHANGE UP (ref 0–0)
NRBC # BLD: 0 /100 WBCS — SIGNIFICANT CHANGE UP (ref 0–0)
PLATELET # BLD AUTO: 162 K/UL — SIGNIFICANT CHANGE UP (ref 150–400)
PLATELET # BLD AUTO: 162 K/UL — SIGNIFICANT CHANGE UP (ref 150–400)
POTASSIUM SERPL-MCNC: 3.7 MMOL/L — SIGNIFICANT CHANGE UP (ref 3.5–5.3)
POTASSIUM SERPL-MCNC: 3.7 MMOL/L — SIGNIFICANT CHANGE UP (ref 3.5–5.3)
POTASSIUM SERPL-SCNC: 3.7 MMOL/L — SIGNIFICANT CHANGE UP (ref 3.5–5.3)
POTASSIUM SERPL-SCNC: 3.7 MMOL/L — SIGNIFICANT CHANGE UP (ref 3.5–5.3)
RBC # BLD: 2.94 M/UL — LOW (ref 4.2–5.8)
RBC # BLD: 2.94 M/UL — LOW (ref 4.2–5.8)
RBC # FLD: 12.8 % — SIGNIFICANT CHANGE UP (ref 10.3–14.5)
RBC # FLD: 12.8 % — SIGNIFICANT CHANGE UP (ref 10.3–14.5)
SODIUM SERPL-SCNC: 139 MMOL/L — SIGNIFICANT CHANGE UP (ref 135–145)
SODIUM SERPL-SCNC: 139 MMOL/L — SIGNIFICANT CHANGE UP (ref 135–145)
WBC # BLD: 5.54 K/UL — SIGNIFICANT CHANGE UP (ref 3.8–10.5)
WBC # BLD: 5.54 K/UL — SIGNIFICANT CHANGE UP (ref 3.8–10.5)
WBC # FLD AUTO: 5.54 K/UL — SIGNIFICANT CHANGE UP (ref 3.8–10.5)
WBC # FLD AUTO: 5.54 K/UL — SIGNIFICANT CHANGE UP (ref 3.8–10.5)

## 2023-11-19 RX ADMIN — CELECOXIB 200 MILLIGRAM(S): 200 CAPSULE ORAL at 18:25

## 2023-11-19 RX ADMIN — OXYCODONE HYDROCHLORIDE 25 MILLIGRAM(S): 5 TABLET ORAL at 14:36

## 2023-11-19 RX ADMIN — Medication 20 MILLIGRAM(S): at 05:38

## 2023-11-19 RX ADMIN — CELECOXIB 200 MILLIGRAM(S): 200 CAPSULE ORAL at 17:41

## 2023-11-19 RX ADMIN — OXYCODONE HYDROCHLORIDE 25 MILLIGRAM(S): 5 TABLET ORAL at 15:30

## 2023-11-19 RX ADMIN — CELECOXIB 200 MILLIGRAM(S): 200 CAPSULE ORAL at 06:29

## 2023-11-19 RX ADMIN — APIXABAN 2.5 MILLIGRAM(S): 2.5 TABLET, FILM COATED ORAL at 17:41

## 2023-11-19 RX ADMIN — PANTOPRAZOLE SODIUM 40 MILLIGRAM(S): 20 TABLET, DELAYED RELEASE ORAL at 05:38

## 2023-11-19 RX ADMIN — LOSARTAN POTASSIUM 50 MILLIGRAM(S): 100 TABLET, FILM COATED ORAL at 05:38

## 2023-11-19 RX ADMIN — Medication 1 TABLET(S): at 10:20

## 2023-11-19 RX ADMIN — OXYCODONE HYDROCHLORIDE 25 MILLIGRAM(S): 5 TABLET ORAL at 21:00

## 2023-11-19 RX ADMIN — Medication 500 MILLIGRAM(S): at 17:41

## 2023-11-19 RX ADMIN — OXYCODONE HYDROCHLORIDE 25 MILLIGRAM(S): 5 TABLET ORAL at 20:01

## 2023-11-19 RX ADMIN — SODIUM CHLORIDE 3 MILLILITER(S): 9 INJECTION INTRAMUSCULAR; INTRAVENOUS; SUBCUTANEOUS at 22:18

## 2023-11-19 RX ADMIN — OXYCODONE HYDROCHLORIDE 25 MILLIGRAM(S): 5 TABLET ORAL at 10:20

## 2023-11-19 RX ADMIN — Medication 25 MILLIGRAM(S): at 05:39

## 2023-11-19 RX ADMIN — ZOLPIDEM TARTRATE 5 MILLIGRAM(S): 10 TABLET ORAL at 02:10

## 2023-11-19 RX ADMIN — APIXABAN 2.5 MILLIGRAM(S): 2.5 TABLET, FILM COATED ORAL at 05:39

## 2023-11-19 RX ADMIN — OXYCODONE HYDROCHLORIDE 25 MILLIGRAM(S): 5 TABLET ORAL at 05:10

## 2023-11-19 RX ADMIN — OXYCODONE HYDROCHLORIDE 25 MILLIGRAM(S): 5 TABLET ORAL at 04:10

## 2023-11-19 RX ADMIN — CELECOXIB 200 MILLIGRAM(S): 200 CAPSULE ORAL at 05:38

## 2023-11-19 RX ADMIN — SODIUM CHLORIDE 3 MILLILITER(S): 9 INJECTION INTRAMUSCULAR; INTRAVENOUS; SUBCUTANEOUS at 13:15

## 2023-11-19 RX ADMIN — Medication 500 MILLIGRAM(S): at 05:39

## 2023-11-19 RX ADMIN — OXYCODONE HYDROCHLORIDE 25 MILLIGRAM(S): 5 TABLET ORAL at 00:15

## 2023-11-19 RX ADMIN — SODIUM CHLORIDE 3 MILLILITER(S): 9 INJECTION INTRAMUSCULAR; INTRAVENOUS; SUBCUTANEOUS at 06:28

## 2023-11-19 RX ADMIN — OXYCODONE HYDROCHLORIDE 25 MILLIGRAM(S): 5 TABLET ORAL at 11:19

## 2023-11-20 ENCOUNTER — TRANSCRIPTION ENCOUNTER (OUTPATIENT)
Age: 68
End: 2023-11-20

## 2023-11-20 VITALS
RESPIRATION RATE: 18 BRPM | SYSTOLIC BLOOD PRESSURE: 158 MMHG | OXYGEN SATURATION: 99 % | DIASTOLIC BLOOD PRESSURE: 70 MMHG | HEART RATE: 94 BPM | TEMPERATURE: 98 F

## 2023-11-20 RX ORDER — ZOLPIDEM TARTRATE 10 MG/1
1 TABLET ORAL
Refills: 0 | DISCHARGE

## 2023-11-20 RX ORDER — ASCORBIC ACID 60 MG
1 TABLET,CHEWABLE ORAL
Qty: 0 | Refills: 0 | DISCHARGE
Start: 2023-11-20

## 2023-11-20 RX ADMIN — Medication 25 MILLIGRAM(S): at 05:17

## 2023-11-20 RX ADMIN — APIXABAN 2.5 MILLIGRAM(S): 2.5 TABLET, FILM COATED ORAL at 05:18

## 2023-11-20 RX ADMIN — OXYCODONE HYDROCHLORIDE 25 MILLIGRAM(S): 5 TABLET ORAL at 14:55

## 2023-11-20 RX ADMIN — OXYCODONE HYDROCHLORIDE 25 MILLIGRAM(S): 5 TABLET ORAL at 05:30

## 2023-11-20 RX ADMIN — SODIUM CHLORIDE 3 MILLILITER(S): 9 INJECTION INTRAMUSCULAR; INTRAVENOUS; SUBCUTANEOUS at 05:20

## 2023-11-20 RX ADMIN — OXYCODONE HYDROCHLORIDE 25 MILLIGRAM(S): 5 TABLET ORAL at 01:25

## 2023-11-20 RX ADMIN — CELECOXIB 200 MILLIGRAM(S): 200 CAPSULE ORAL at 05:17

## 2023-11-20 RX ADMIN — Medication 500 MILLIGRAM(S): at 17:52

## 2023-11-20 RX ADMIN — OXYCODONE HYDROCHLORIDE 25 MILLIGRAM(S): 5 TABLET ORAL at 09:58

## 2023-11-20 RX ADMIN — APIXABAN 2.5 MILLIGRAM(S): 2.5 TABLET, FILM COATED ORAL at 17:52

## 2023-11-20 RX ADMIN — PANTOPRAZOLE SODIUM 40 MILLIGRAM(S): 20 TABLET, DELAYED RELEASE ORAL at 05:17

## 2023-11-20 RX ADMIN — CELECOXIB 200 MILLIGRAM(S): 200 CAPSULE ORAL at 18:01

## 2023-11-20 RX ADMIN — OXYCODONE HYDROCHLORIDE 25 MILLIGRAM(S): 5 TABLET ORAL at 15:50

## 2023-11-20 RX ADMIN — SODIUM CHLORIDE 3 MILLILITER(S): 9 INJECTION INTRAMUSCULAR; INTRAVENOUS; SUBCUTANEOUS at 14:28

## 2023-11-20 RX ADMIN — OXYCODONE HYDROCHLORIDE 25 MILLIGRAM(S): 5 TABLET ORAL at 04:30

## 2023-11-20 RX ADMIN — Medication 20 MILLIGRAM(S): at 05:18

## 2023-11-20 RX ADMIN — Medication 500 MILLIGRAM(S): at 05:17

## 2023-11-20 RX ADMIN — LOSARTAN POTASSIUM 50 MILLIGRAM(S): 100 TABLET, FILM COATED ORAL at 05:17

## 2023-11-20 RX ADMIN — CELECOXIB 200 MILLIGRAM(S): 200 CAPSULE ORAL at 17:52

## 2023-11-20 RX ADMIN — OXYCODONE HYDROCHLORIDE 25 MILLIGRAM(S): 5 TABLET ORAL at 00:25

## 2023-11-20 RX ADMIN — Medication 1 TABLET(S): at 11:58

## 2023-11-20 RX ADMIN — OXYCODONE HYDROCHLORIDE 25 MILLIGRAM(S): 5 TABLET ORAL at 10:58

## 2023-11-20 RX ADMIN — CELECOXIB 200 MILLIGRAM(S): 200 CAPSULE ORAL at 06:15

## 2023-11-20 NOTE — PROGRESS NOTE ADULT - SUBJECTIVE AND OBJECTIVE BOX
YUDITH WATSON is a 68y Male s/p LEFT ROBOT ASSISTED TOTAL HIP ARTHROPLASTY WITH JOHN        denies any chest pain shortness of breath palpitation dizziness lightheadedness nausea vomiting fever or chills    T(C): 36.6 (11-17-23 @ 06:00), Max: 37.1 (11-16-23 @ 13:06)  HR: 98 (11-17-23 @ 06:00) (89 - 99)  BP: 172/84 (11-17-23 @ 06:00) (160/73 - 180/76)  RR: 18 (11-17-23 @ 06:00) (18 - 18)  SpO2: 96% (11-17-23 @ 06:00) (96% - 99%)  no jvd/bruit  s1 s2 rrr  cta  s/nt/nd  no calf tend                        11.1   12.37 )-----------( 204      ( 16 Nov 2023 06:20 )             31.0   11-16    132<L>  |  100  |  10  ----------------------------<  158<H>  4.4   |  25  |  0.83    Ca    8.9      16 Nov 2023 06:20  Phos  3.2     11-16  Mg     1.7     11-16        cont dvt px  pain control  bowel regimen  antiemetics  incentive spirometer                   
Patient is seen and examined at bedside. Denies CP/SOB/Dizziness/N/V/D/HA. Pain is better controlled. Has chronic pain but tolerating oxy 25mg. Has history of multiple injuries over the last 50 years with some long term pain. Would like to go home however has not started with PT yet.     Vital Signs Last 24 Hrs  T(C): 37.1 (16 Nov 2023 13:06), Max: 37.2 (16 Nov 2023 06:00)  T(F): 98.8 (16 Nov 2023 13:06), Max: 98.9 (16 Nov 2023 06:00)  HR: 97 (16 Nov 2023 13:06) (58 - 99)  BP: 180/76 (16 Nov 2023 13:06) (108/56 - 180/76)  BP(mean): --  RR: 18 (16 Nov 2023 13:06) (12 - 18)  SpO2: 99% (16 Nov 2023 13:06) (94% - 100%)    Parameters below as of 16 Nov 2023 13:06  Patient On (Oxygen Delivery Method): room air        GEN: NAD  ABD: soft, NT/ND; no rebound or guarding;  Neurologic: AAOx3; CNS grossly intact; no focal deficits  RLE: Motor intact + EHL/FHL/TA/GS. Sensation is grossly intact.  Extremities warm. . Compartments soft, compressible. No calf tenderness. DP 2+.  LLE: Prineo dressing with dry sanguinous stain. Thigh: edematous, compartments compressible.  Motor intact + EHL/FHL/TA/GS. Sensation is grossly intact. Extremities warm. Compartments soft, compressible, +stasis dermatitis. + pedal edema. No calf tenderness. DP 2+.    Labs:                        9.0    10.87 )-----------( 193      ( 17 Nov 2023 07:20 )             26.2     11-17    136  |  103  |  11  ----------------------------<  179<H>  3.6   |  26  |  0.76    Ca    8.8      17 Nov 2023 07:20  Phos  3.2     11-16  Mg     1.7     11-16        A/P: Patient is a 68y y/o Male s/p left GINNA, POD # 2  -wound care, isometric exercises, GI motility, new medications, hip precautions,  hospital course and discharge planning reviewed with pt  -Pain control/analgesia: Oxycodone 25mg trial.  -Inc spirometry reviewed and counseled  -Venodynes/foot pumps  -PT/OT/WBAT  -Anticoagulation - d/w Dr Chaney - pt with poor mobility - will start eliquis  -DC planning for rehab   -Medical consult reviewed     
Patient seen and examined at bedside. Patient reports pain well controlled on medications. No acute events overnight. Pt denies fevers, chills, new onset numbness, weakness or tingling in the extremities.    T(C): 36.6 (11-16-23 @ 01:50), Max: 36.7 (11-15-23 @ 09:51)  T(F): 97.8 (11-16-23 @ 01:50), Max: 98 (11-15-23 @ 09:51)  HR: 92 (11-16-23 @ 01:50) (58 - 92)  BP: 162/88 (11-16-23 @ 01:50) (108/56 - 175/79)  RR: 18 (11-16-23 @ 01:50) (12 - 18)  SpO2: 97% (11-16-23 @ 01:50) (94% - 100%)    LLE:  Skin: No erythema, edema or gross lesions noted. Dressings c/d/i  Dali-incisional TTP, otherwise NTTP  SILT L2-S1  Motor: +EHL/FHL/TA/GSc  Compartments soft and compressible  Calves NTTP b/l  +2 DP    A/p:  Pt is s/p L GINNA 11/15  WBAT LLE/PT/OT  Pain regimen PRN  DVT ppx: A81  Post op abx ppx  Dispo per PT  Plan discussed w patient, who is in agreement with the above  Plan discussed w attending, who is in agreement with the above  
Patient seen and examined at bedside. Patient reports pain well controlled on medications. No acute events overnight. Pt denies fevers, chills, new onset numbness, weakness or tingling in the extremities.    T(C): 37 (11-18-23 @ 04:32), Max: 37.7 (11-17-23 @ 17:44)  T(F): 98.6 (11-18-23 @ 04:32), Max: 99.9 (11-17-23 @ 17:44)  HR: 81 (11-18-23 @ 04:32) (80 - 84)  BP: 137/58 (11-18-23 @ 04:32) (133/82 - 167/63)  RR: 17 (11-18-23 @ 04:32) (17 - 18)  SpO2: 95% (11-18-23 @ 04:32) (95% - 100%)    GEN: NAD  ABD: soft, NT/ND; no rebound or guarding;  Neurologic: AAOx3; CNS grossly intact; no focal deficits  RLE: Motor intact + EHL/FHL/TA/GS. Sensation is grossly intact.  Extremities warm. . Compartments soft, compressible. No calf tenderness. DP 2+.  LLE: Prineo dressing with dry sanguinous stain. Thigh: edematous, compartments compressible.  Motor intact + EHL/FHL/TA/GS. Sensation is grossly intact. Extremities warm. Compartments soft, compressible, +stasis dermatitis. + pedal edema. No calf tenderness. DP 2+.    A/P: Patient is a 68y y/o Male s/p left GINNA, POD # 3  -wound care, isometric exercises, GI motility, new medications, hip precautions,  hospital course and discharge planning reviewed with pt  -Pain control/analgesia: Oxycodone 25mg trial.  -Inc spirometry reviewed and counseled  -Venodynes/foot pumps  -PT/OT/WBAT  -Anticoagulation - d/w Dr Chaney - pt with poor mobility - will start eliquis  -DC planning for rehab   -Medical consult reviewed 
Patient is s/p L GINNA POD#5  Pt tolerated procedure well without any intra-op complications.   Pt doing well at this time. Pain is controlled.   Denies CP/SOB/Dizziness/N/V/D/HA.     Vital Signs Last 24 Hrs  T(C): 36.6 (20 Nov 2023 04:22), Max: 36.8 (19 Nov 2023 13:00)  T(F): 97.9 (20 Nov 2023 04:22), Max: 98.2 (19 Nov 2023 13:00)  HR: 66 (20 Nov 2023 04:22) (66 - 74)  BP: 148/69 (20 Nov 2023 04:22) (148/69 - 165/68)  BP(mean): --  RR: 18 (20 Nov 2023 04:22) (16 - 18)  SpO2: 97% (20 Nov 2023 04:22) (96% - 97%)    Parameters below as of 20 Nov 2023 04:22  Patient On (Oxygen Delivery Method): room air        PE:  General: A&Ox3 NAD  LLE: Dressing C/D/I. abduction pillow in place. Motor intact + EHL/FHL/TA/GS. Sensation is grossly intact. Extremity warm. Compartments soft, compressible, no calf tenderness. DP 2+   RLE: Motor intact + EHL/FHL/TA/GS. Sensation is grossly intact. Extremity warm. Compartments soft, compressible, no calf tenderness. DP 2+     Labs:                          9.3    5.54  )-----------( 162      ( 19 Nov 2023 09:15 )             27.0       11-19    139  |  106  |  15  ----------------------------<  143<H>  3.7   |  27  |  0.69    Ca    8.4<L>      19 Nov 2023 09:15        A/P: Patient is a 68y y/o Male s/p L GINNA, POD #5  -wound care, isometric exercises, GI motility, new medications, hospital course reviewed with pt  -Pain control/analgesia: OXY 20/25mg  -Inc spirometry reviewed and counseled  -DVT ppx with venodynes and Eliquis  -F/U AM Labs  -PT/OT/WBAT, Posterior hip precautions,   -Antibiotic post op  -Medical consult  -Discharge planning [facility] as per PT    
Patient is seen and examined at bedside. Denies CP/SOB/Dizziness/N/V/D/HA. Pain is not controlled. C/O severe spasm. Has chronic pain - history of multiple injuries over the last 50 years with some long term sequalea however pt notes he was managed with pain management up until about 2months ago.      Vital Signs Last 24 Hrs  T(C): 37.1 (16 Nov 2023 13:06), Max: 37.2 (16 Nov 2023 06:00)  T(F): 98.8 (16 Nov 2023 13:06), Max: 98.9 (16 Nov 2023 06:00)  HR: 97 (16 Nov 2023 13:06) (58 - 99)  BP: 180/76 (16 Nov 2023 13:06) (108/56 - 180/76)  BP(mean): --  RR: 18 (16 Nov 2023 13:06) (12 - 18)  SpO2: 99% (16 Nov 2023 13:06) (94% - 100%)    Parameters below as of 16 Nov 2023 13:06  Patient On (Oxygen Delivery Method): room air        GEN: NAD  ABD: soft, NT/ND; no rebound or guarding;  Neurologic: AAOx3; CNS grossly intact; no focal deficits  RLE: Motor intact + EHL/FHL/TA/GS. Sensation is grossly intact.  Extremities warm. . Compartments soft, compressible. No calf tenderness. DP 2+.  LLE: Prineo dressing with dry sanguinous stain. Thigh: edematous, compartments compressible.  Motor intact + EHL/FHL/TA/GS. Sensation is grossly intact. Extremities warm. Compartments soft, compressible, +stasis dermatitis. + pedal edema. No calf tenderness.. DP 2+.    Labs:                          11.1   12.37 )-----------( 204      ( 16 Nov 2023 06:20 )             31.0       11-16    132<L>  |  100  |  10  ----------------------------<  158<H>  4.4   |  25  |  0.83    Ca    8.9      16 Nov 2023 06:20  Phos  3.2     11-16  Mg     1.7     11-16        A/P: Patient is a 68y y/o Male s/p left GINNA, POD # 1  -wound care, isometric exercises, GI motility, new medications, hip precautions,  hospital course and discharge planning reviewed with pt  -Pain control/analgesia: valium 2mg IVP X 1 dose, IV tylenol. Oxycodone 25mg trial. (takes oxy 20mg at home Q6h, soma - istop checked just filled Rx on 11/7/23)  -Inc spirometry reviewed and counseled  -Venodynes/foot pumps  -PT/OT/WBAT  -Anticoagulation - d/w Dr Chaney - pt with poor mobility - will start eliquis  -DC planning for rehab   -Medical consult reviewed  -Pt seen with DR Chaney     
Patient seen and examined at bedside. Patient reports pain well controlled on medications. No acute events overnight. Pt denies fevers, chills, new onset numbness, weakness or tingling in the extremities.    Vital Signs (24 Hrs):  T(C): 36.8 (11-19-23 @ 05:00), Max: 37.1 (11-18-23 @ 12:39)  HR: 64 (11-19-23 @ 05:00) (63 - 90)  BP: 151/72 (11-19-23 @ 05:00) (113/67 - 183/69)  RR: 17 (11-19-23 @ 05:00) (16 - 18)  SpO2: 96% (11-19-23 @ 05:00) (96% - 99%)  Wt(kg): --    LABS:                          7.4    5.21  )-----------( 140      ( 18 Nov 2023 10:40 )             21.8     11-18    140  |  107  |  14  ----------------------------<  202<H>  4.0   |  26  |  0.76    Ca    8.4<L>      18 Nov 2023 10:40      Physical Exam:  GEN: NAD  ABD: soft, NT/ND; no rebound or guarding;  Neurologic: AAOx3; CNS grossly intact; no focal deficits  RLE: Motor intact + EHL/FHL/TA/GS. Sensation is grossly intact.  Extremities warm. . Compartments soft, compressible. No calf tenderness. DP 2+.  LLE: Prineo dressing with dry sanguinous stain. Thigh: edematous, compartments compressible.  Motor intact + EHL/FHL/TA/GS. Sensation is grossly intact. Extremities warm. Compartments soft, compressible, +stasis dermatitis. + pedal edema. No calf tenderness. DP 2+.    A/P: Patient is a 68y y/o Male s/p left GINNA, POD # 4  -wound care, isometric exercises, GI motility, new medications, hip precautions,  hospital course and discharge planning reviewed with pt  -Pain control/analgesia: Oxycodone 25mg trial.  -Inc spirometry reviewed and counseled  -Venodynes/foot pumps  -PT/OT/WBAT  -Anticoagulation - d/w Dr Ritu joe  -SKIP planning for rehab   -Medical consult reviewed   -Will discuss plan with Dr. Chaney and will advise changes to plan as needed
YDUITH WATSON is a 68y Male s/p LEFT ROBOT ASSISTED TOTAL HIP ARTHROPLASTY WITH JOHN        denies any chest pain shortness of breath palpitation dizziness lightheadedness nausea vomiting fever or chills    T(C): 36.6 (11-16-23 @ 09:15), Max: 37.2 (11-16-23 @ 06:00)  HR: 99 (11-16-23 @ 09:15) (58 - 99)  BP: 172/82 (11-16-23 @ 09:15) (108/56 - 175/79)  RR: 18 (11-16-23 @ 09:15) (12 - 18)  SpO2: 98% (11-16-23 @ 09:15) (94% - 100%)  no jvd/bruit  s1 s2 rrr  cta  s/nt/nd  no calf tend                        11.1   12.37 )-----------( 204      ( 16 Nov 2023 06:20 )             31.0   11-16    132<L>  |  100  |  10  ----------------------------<  158<H>  4.4   |  25  |  0.83    Ca    8.9      16 Nov 2023 06:20        cont dvt px  pain control  bowel regimen  antiemetics  incentive spirometer

## 2023-11-20 NOTE — DISCHARGE NOTE NURSING/CASE MANAGEMENT/SOCIAL WORK - NSDCPEFALRISK_GEN_ALL_CORE
For information on Fall & Injury Prevention, visit: [email or web address] OR  [email or web address] OR  https://www.cdc.gov/steadi/patient.html

## 2023-11-20 NOTE — CHART NOTE - NSCHARTNOTEFT_GEN_A_CORE
Patient seen with Dr. Chaney.   Patient at this time being recommended discharge to [facility] due to functional mobility and safety. Patient is animate about not wanting to be discharged to rehab. Patient is POD#5 and is still unable to safety navigate without assistance. Dr. Chaney agrees with the PT and upholds the recs of going to [facility] The patient states he wants to go home and will sign out AMA if he has to. Dr. Chaney spoke to the patient's wife to explain the situation and she told him that she is unable to convince her  to go to rehab.  Told the patient to stay the night and work with PT tomorrow the reassess how his functional mobility progresses.

## 2023-11-20 NOTE — PROGRESS NOTE ADULT - PROVIDER SPECIALTY LIST ADULT
Internal Medicine
Orthopedics
Internal Medicine
Orthopedics

## 2023-11-20 NOTE — DISCHARGE NOTE NURSING/CASE MANAGEMENT/SOCIAL WORK - NSDCFUADDAPPT_GEN_ALL_CORE_FT
Follow up with your surgeon in two weeks. Call for appointment.    If you need more pain medications, call your surgeon's office. For medication refills or authorizations call [phone]    Make sure to have a bowel movement by 2 days after surgery. Take stool softners and laxatives as needed.    Call and schedule a follow up appointment with your primary care physician for repeat blood work (CBC and BMP) for post hospital discharge follow-up care.    Call your surgeon if you have increased redness/pain/drainage or fever. Return to ER for shortness of breath/calf tenderness.

## 2023-11-20 NOTE — DISCHARGE NOTE NURSING/CASE MANAGEMENT/SOCIAL WORK - PATIENT PORTAL LINK FT
You can access the FollowMyHealth Patient Portal offered by [facility] by registering at the following website: [email or web address]. By joining [facility]’s FollowMyHealth portal, you will also be able to view your health information using other applications (apps) compatible with our system.

## 2023-11-22 DIAGNOSIS — G89.29 OTHER CHRONIC PAIN: ICD-10-CM

## 2023-11-22 DIAGNOSIS — M16.12 UNILATERAL PRIMARY OSTEOARTHRITIS, LEFT HIP: ICD-10-CM

## 2023-11-22 DIAGNOSIS — E78.5 HYPERLIPIDEMIA, UNSPECIFIED: ICD-10-CM

## 2023-11-22 DIAGNOSIS — I87.2 VENOUS INSUFFICIENCY (CHRONIC) (PERIPHERAL): ICD-10-CM

## 2023-11-22 DIAGNOSIS — I10 ESSENTIAL (PRIMARY) HYPERTENSION: ICD-10-CM

## 2023-11-22 DIAGNOSIS — D62 ACUTE POSTHEMORRHAGIC ANEMIA: ICD-10-CM

## 2023-11-27 ENCOUNTER — NON-APPOINTMENT (OUTPATIENT)
Age: 68
End: 2023-11-27

## 2023-11-27 PROBLEM — F12.90 CANNABIS USE, UNSPECIFIED, UNCOMPLICATED: Chronic | Status: ACTIVE | Noted: 2023-11-15

## 2023-11-27 RX ORDER — OXYCODONE 20 MG/1
20 TABLET ORAL EVERY 4 HOURS
Qty: 42 | Refills: 0 | Status: ACTIVE | COMMUNITY
Start: 2023-11-27 | End: 1900-01-01

## 2023-11-27 RX ORDER — CYCLOBENZAPRINE HYDROCHLORIDE 10 MG/1
10 TABLET, FILM COATED ORAL 3 TIMES DAILY
Qty: 60 | Refills: 0 | Status: ACTIVE | COMMUNITY
Start: 2023-11-27 | End: 1900-01-01

## 2023-11-28 ENCOUNTER — APPOINTMENT (OUTPATIENT)
Dept: ORTHOPEDIC SURGERY | Facility: CLINIC | Age: 68
End: 2023-11-28

## 2023-12-01 ENCOUNTER — APPOINTMENT (OUTPATIENT)
Dept: ORTHOPEDIC SURGERY | Facility: CLINIC | Age: 68
End: 2023-12-01
Payer: COMMERCIAL

## 2023-12-01 VITALS — BODY MASS INDEX: 31.99 KG/M2 | WEIGHT: 192 LBS | HEIGHT: 65 IN

## 2023-12-01 DIAGNOSIS — Z96.642 PRESENCE OF LEFT ARTIFICIAL HIP JOINT: ICD-10-CM

## 2023-12-01 DIAGNOSIS — M16.12 UNILATERAL PRIMARY OSTEOARTHRITIS, LEFT HIP: ICD-10-CM

## 2023-12-01 PROCEDURE — 73502 X-RAY EXAM HIP UNI 2-3 VIEWS: CPT

## 2023-12-01 PROCEDURE — 99024 POSTOP FOLLOW-UP VISIT: CPT

## 2023-12-01 PROCEDURE — 20610 DRAIN/INJ JOINT/BURSA W/O US: CPT | Mod: 58,LT

## 2024-01-19 ENCOUNTER — APPOINTMENT (OUTPATIENT)
Dept: ORTHOPEDIC SURGERY | Facility: CLINIC | Age: 69
End: 2024-01-19

## 2024-10-02 ENCOUNTER — NON-APPOINTMENT (OUTPATIENT)
Age: 69
End: 2024-10-02

## 2024-10-03 ENCOUNTER — APPOINTMENT (OUTPATIENT)
Dept: ORTHOPEDIC SURGERY | Facility: CLINIC | Age: 69
End: 2024-10-03
Payer: COMMERCIAL

## 2024-10-03 VITALS — WEIGHT: 192 LBS | HEIGHT: 65 IN | BODY MASS INDEX: 31.99 KG/M2

## 2024-10-03 DIAGNOSIS — M51.369: ICD-10-CM

## 2024-10-03 PROCEDURE — 99204 OFFICE O/P NEW MOD 45 MIN: CPT

## (undated) DEVICE — DRSG AQUACEL 3.5 X 12"

## (undated) DEVICE — ELCTR AQUAMANTYS BIPOLAR SEALER 6.0

## (undated) DEVICE — BLADE SURGICAL #11 CARBON

## (undated) DEVICE — SUT STRATAFIX SYMMETRIC PDS PLUS 1 18" CTX VIOLET

## (undated) DEVICE — DRAPE U 47X51" LF STERILE

## (undated) DEVICE — SUT ETHIBOND 5 4-30" V-37

## (undated) DEVICE — DRAPE STERI-DRAPE INCISE 32X33"

## (undated) DEVICE — MAKO VIZADISC HIP PROCEDURE TRACKING KIT

## (undated) DEVICE — GLV 7 PROTEXIS (WHITE)

## (undated) DEVICE — PACK TOTAL HIP

## (undated) DEVICE — GOWN XL

## (undated) DEVICE — DRAPE INSTRUMENT POUCH 6.75" X 11"

## (undated) DEVICE — SUT STRATAFIX SPIRAL PDS PLUS 2-0 45CM CT-1

## (undated) DEVICE — ELCTR BOVIE TIP BLADE INSULATED 6.5" EDGE

## (undated) DEVICE — GLV 7 PROTEXIS (BLUE)

## (undated) DEVICE — PREP CHLORAPREP HI-LITE ORANGE 26ML

## (undated) DEVICE — ZIMMER PULSAVAC PLUS FAN KIT

## (undated) DEVICE — DRAPE MAYO STAND 23"

## (undated) DEVICE — SAW BLADE STRYKER SAGITTAL DUAL CUT 25X90X1.27MM

## (undated) DEVICE — NDL HYPO SAFE 22G X 1.5" (BLACK)

## (undated) DEVICE — SOL BETADINE POUCH 0.75OZ STERILE

## (undated) DEVICE — MAKO DRAPE KIT

## (undated) DEVICE — SUT STRATAFIX SPIRAL MONOCRYL PLUS 4-0 45CM PS-2 UNDYED

## (undated) DEVICE — MEDICATION LABELS W MARKER

## (undated) DEVICE — NDL HYPO SAFE 18G X 1.5" (PINK)

## (undated) DEVICE — HOOD T5 PEELAWAY

## (undated) DEVICE — SUCTION YANKAUER TAPERED BULBOUS NO VENT

## (undated) DEVICE — GLV 7 PROTEXIS ORTHO (BROWN)

## (undated) DEVICE — FRA-ESU BOVIE FORCE TRIAD T6D04558DX: Type: DURABLE MEDICAL EQUIPMENT

## (undated) DEVICE — SYR LUER LOK 50CC

## (undated) DEVICE — DRAPE TOWEL BLUE 17" X 24"

## (undated) DEVICE — DRAPE 3/4 SHEET W REINFORCEMENT 56X77"

## (undated) DEVICE — SUT HISTOACRYL BLUE